# Patient Record
Sex: MALE | Race: WHITE | Employment: FULL TIME | URBAN - NONMETROPOLITAN AREA
[De-identification: names, ages, dates, MRNs, and addresses within clinical notes are randomized per-mention and may not be internally consistent; named-entity substitution may affect disease eponyms.]

---

## 2017-05-10 ENCOUNTER — PROCEDURE VISIT (OUTPATIENT)
Dept: CARDIOLOGY | Age: 57
End: 2017-05-10
Payer: COMMERCIAL

## 2017-05-10 DIAGNOSIS — I45.10 RBBB: ICD-10-CM

## 2017-05-10 DIAGNOSIS — I49.5 SICK SINUS SYNDROME (HCC): ICD-10-CM

## 2017-05-10 DIAGNOSIS — Z95.0 CARDIAC PACEMAKER IN SITU: Primary | ICD-10-CM

## 2017-05-10 PROCEDURE — 93280 PM DEVICE PROGR EVAL DUAL: CPT | Performed by: INTERNAL MEDICINE

## 2017-08-04 ENCOUNTER — OFFICE VISIT (OUTPATIENT)
Dept: FAMILY MEDICINE CLINIC | Age: 57
End: 2017-08-04
Payer: COMMERCIAL

## 2017-08-04 ENCOUNTER — NURSE ONLY (OUTPATIENT)
Dept: LAB | Age: 57
End: 2017-08-04
Payer: COMMERCIAL

## 2017-08-04 VITALS
SYSTOLIC BLOOD PRESSURE: 136 MMHG | DIASTOLIC BLOOD PRESSURE: 80 MMHG | OXYGEN SATURATION: 95 % | BODY MASS INDEX: 31.08 KG/M2 | WEIGHT: 222 LBS | HEART RATE: 71 BPM | HEIGHT: 71 IN

## 2017-08-04 DIAGNOSIS — Z23 NEED FOR TDAP VACCINATION: ICD-10-CM

## 2017-08-04 DIAGNOSIS — Z95.0 PACEMAKER: ICD-10-CM

## 2017-08-04 DIAGNOSIS — I49.5 SICK SINUS SYNDROME (HCC): ICD-10-CM

## 2017-08-04 DIAGNOSIS — K21.9 GASTROESOPHAGEAL REFLUX DISEASE, ESOPHAGITIS PRESENCE NOT SPECIFIED: Primary | ICD-10-CM

## 2017-08-04 DIAGNOSIS — M54.50 INTERMITTENT LOW BACK PAIN: ICD-10-CM

## 2017-08-04 DIAGNOSIS — Z13.31 DEPRESSION SCREENING: ICD-10-CM

## 2017-08-04 DIAGNOSIS — Z11.59 NEED FOR HEPATITIS C SCREENING TEST: ICD-10-CM

## 2017-08-04 DIAGNOSIS — Z13.1 SCREENING FOR DIABETES MELLITUS: ICD-10-CM

## 2017-08-04 PROCEDURE — 99214 OFFICE O/P EST MOD 30 MIN: CPT | Performed by: FAMILY MEDICINE

## 2017-08-04 PROCEDURE — 90471 IMMUNIZATION ADMIN: CPT | Performed by: FAMILY MEDICINE

## 2017-08-04 PROCEDURE — G0444 DEPRESSION SCREEN ANNUAL: HCPCS | Performed by: FAMILY MEDICINE

## 2017-08-04 PROCEDURE — 90715 TDAP VACCINE 7 YRS/> IM: CPT | Performed by: FAMILY MEDICINE

## 2017-08-04 ASSESSMENT — ENCOUNTER SYMPTOMS
CONSTIPATION: 0
NAUSEA: 0
WHEEZING: 0
DIARRHEA: 0
BLOOD IN STOOL: 1
SHORTNESS OF BREATH: 0
VOMITING: 0

## 2017-08-04 ASSESSMENT — PATIENT HEALTH QUESTIONNAIRE - PHQ9
SUM OF ALL RESPONSES TO PHQ QUESTIONS 1-9: 0
SUM OF ALL RESPONSES TO PHQ9 QUESTIONS 1 & 2: 0
2. FEELING DOWN, DEPRESSED OR HOPELESS: 0
1. LITTLE INTEREST OR PLEASURE IN DOING THINGS: 0

## 2017-09-05 ENCOUNTER — HOSPITAL ENCOUNTER (OUTPATIENT)
Dept: LAB | Age: 57
Discharge: HOME | End: 2017-09-05
Payer: COMMERCIAL

## 2017-09-05 DIAGNOSIS — Z13.1 SCREENING FOR DIABETES MELLITUS: ICD-10-CM

## 2017-09-05 DIAGNOSIS — Z11.59 NEED FOR HEPATITIS C SCREENING TEST: ICD-10-CM

## 2017-09-05 LAB
ALBUMIN SERPL-MCNC: 4.2 G/DL (ref 3.5–5.2)
ALBUMIN/GLOBULIN RATIO: 1.4 (ref 1–2.5)
ALP BLD-CCNC: 89 U/L (ref 40–129)
ALT SERPL-CCNC: 14 U/L (ref 5–41)
ANION GAP SERPL CALCULATED.3IONS-SCNC: 11 MMOL/L (ref 9–17)
AST SERPL-CCNC: 16 U/L
BILIRUB SERPL-MCNC: 0.85 MG/DL (ref 0.3–1.2)
BUN BLDV-MCNC: 13 MG/DL (ref 6–20)
BUN/CREAT BLD: 12 (ref 9–20)
CALCIUM SERPL-MCNC: 9.1 MG/DL (ref 8.6–10.4)
CHLORIDE BLD-SCNC: 104 MMOL/L (ref 98–107)
CO2: 26 MMOL/L (ref 20–31)
CREAT SERPL-MCNC: 1.05 MG/DL (ref 0.7–1.2)
GFR AFRICAN AMERICAN: >60 ML/MIN
GFR NON-AFRICAN AMERICAN: >60 ML/MIN
GFR SERPL CREATININE-BSD FRML MDRD: ABNORMAL ML/MIN/{1.73_M2}
GFR SERPL CREATININE-BSD FRML MDRD: ABNORMAL ML/MIN/{1.73_M2}
GLUCOSE BLD-MCNC: 100 MG/DL (ref 70–99)
HEPATITIS C ANTIBODY: NONREACTIVE
POTASSIUM SERPL-SCNC: 4.7 MMOL/L (ref 3.7–5.3)
SODIUM BLD-SCNC: 141 MMOL/L (ref 135–144)
TOTAL PROTEIN: 7.1 G/DL (ref 6.4–8.3)

## 2017-09-05 PROCEDURE — 86803 HEPATITIS C AB TEST: CPT

## 2017-09-05 PROCEDURE — 36415 COLL VENOUS BLD VENIPUNCTURE: CPT

## 2017-09-05 PROCEDURE — 80053 COMPREHEN METABOLIC PANEL: CPT

## 2017-09-15 ENCOUNTER — OFFICE VISIT (OUTPATIENT)
Dept: CARDIOLOGY | Age: 57
End: 2017-09-15
Payer: COMMERCIAL

## 2017-09-15 VITALS
HEIGHT: 71 IN | SYSTOLIC BLOOD PRESSURE: 156 MMHG | DIASTOLIC BLOOD PRESSURE: 100 MMHG | WEIGHT: 222 LBS | HEART RATE: 66 BPM | BODY MASS INDEX: 31.08 KG/M2

## 2017-09-15 DIAGNOSIS — I49.5 SICK SINUS SYNDROME (HCC): Primary | ICD-10-CM

## 2017-09-15 DIAGNOSIS — Z95.0 PACEMAKER: ICD-10-CM

## 2017-09-15 PROCEDURE — 99213 OFFICE O/P EST LOW 20 MIN: CPT | Performed by: INTERNAL MEDICINE

## 2017-09-15 PROCEDURE — 93000 ELECTROCARDIOGRAM COMPLETE: CPT | Performed by: INTERNAL MEDICINE

## 2017-09-15 ASSESSMENT — ENCOUNTER SYMPTOMS
SHORTNESS OF BREATH: 0
CHEST TIGHTNESS: 0
ABDOMINAL PAIN: 0
EYE ITCHING: 0
VOMITING: 0
NAUSEA: 0

## 2018-04-23 ENCOUNTER — PROCEDURE VISIT (OUTPATIENT)
Dept: CARDIOLOGY | Age: 58
End: 2018-04-23
Payer: COMMERCIAL

## 2018-04-23 DIAGNOSIS — Z95.0 PACEMAKER: Primary | ICD-10-CM

## 2018-04-23 DIAGNOSIS — I49.5 SICK SINUS SYNDROME (HCC): ICD-10-CM

## 2018-04-23 PROCEDURE — 93280 PM DEVICE PROGR EVAL DUAL: CPT | Performed by: INTERNAL MEDICINE

## 2018-05-11 ENCOUNTER — OFFICE VISIT (OUTPATIENT)
Dept: CARDIOLOGY | Age: 58
End: 2018-05-11
Payer: COMMERCIAL

## 2018-05-11 VITALS
HEART RATE: 60 BPM | SYSTOLIC BLOOD PRESSURE: 134 MMHG | WEIGHT: 224 LBS | HEIGHT: 71 IN | BODY MASS INDEX: 31.36 KG/M2 | DIASTOLIC BLOOD PRESSURE: 86 MMHG

## 2018-05-11 DIAGNOSIS — Z95.0 PACEMAKER: Primary | ICD-10-CM

## 2018-05-11 DIAGNOSIS — R07.9 CHEST PAIN, UNSPECIFIED TYPE: ICD-10-CM

## 2018-05-11 PROCEDURE — 99214 OFFICE O/P EST MOD 30 MIN: CPT | Performed by: INTERNAL MEDICINE

## 2018-05-11 PROCEDURE — 93000 ELECTROCARDIOGRAM COMPLETE: CPT | Performed by: INTERNAL MEDICINE

## 2018-05-11 ASSESSMENT — ENCOUNTER SYMPTOMS
CHEST TIGHTNESS: 0
DIARRHEA: 0
NAUSEA: 0
VOMITING: 0
BACK PAIN: 0
ABDOMINAL DISTENTION: 0
SHORTNESS OF BREATH: 0

## 2018-05-24 ENCOUNTER — HOSPITAL ENCOUNTER (OUTPATIENT)
Dept: NON INVASIVE DIAGNOSTICS | Age: 58
Discharge: HOME | End: 2018-05-24
Payer: COMMERCIAL

## 2018-05-24 ENCOUNTER — HOSPITAL ENCOUNTER (OUTPATIENT)
Dept: NUCLEAR MEDICINE | Age: 58
Discharge: HOME | End: 2018-05-26
Payer: COMMERCIAL

## 2018-05-24 ENCOUNTER — HOSPITAL ENCOUNTER (OUTPATIENT)
Dept: NUCLEAR MEDICINE | Age: 58
End: 2018-05-24
Payer: COMMERCIAL

## 2018-05-24 DIAGNOSIS — R07.9 CHEST PAIN, UNSPECIFIED TYPE: ICD-10-CM

## 2018-05-24 PROCEDURE — A9500 TC99M SESTAMIBI: HCPCS | Performed by: INTERNAL MEDICINE

## 2018-05-24 PROCEDURE — 3430000000 HC RX DIAGNOSTIC RADIOPHARMACEUTICAL: Performed by: INTERNAL MEDICINE

## 2018-05-24 PROCEDURE — 78452 HT MUSCLE IMAGE SPECT MULT: CPT

## 2018-05-24 PROCEDURE — 93017 CV STRESS TEST TRACING ONLY: CPT

## 2018-05-24 RX ADMIN — TETRAKIS(2-METHOXYISOBUTYLISOCYANIDE)COPPER(I) TETRAFLUOROBORATE 10 MILLICURIE: 1 INJECTION, POWDER, LYOPHILIZED, FOR SOLUTION INTRAVENOUS at 13:13

## 2018-05-24 RX ADMIN — TETRAKIS(2-METHOXYISOBUTYLISOCYANIDE)COPPER(I) TETRAFLUOROBORATE 30 MILLICURIE: 1 INJECTION, POWDER, LYOPHILIZED, FOR SOLUTION INTRAVENOUS at 15:05

## 2018-06-01 LAB
AVERAGE GLUCOSE: NORMAL
AVERAGE GLUCOSE: NORMAL
BASOPHILS ABSOLUTE: 52 /ΜL
BASOPHILS RELATIVE PERCENT: NORMAL
CHOLESTEROL, TOTAL: 162 MG/DL
CHOLESTEROL, TOTAL: 162 MG/DL
CHOLESTEROL/HDL RATIO: 3.8
CHOLESTEROL/HDL RATIO: 3.8
EOSINOPHILS ABSOLUTE: 266 /ΜL
EOSINOPHILS RELATIVE PERCENT: NORMAL
HBA1C MFR BLD: 5.6 %
HBA1C MFR BLD: 5.6 %
HCT VFR BLD CALC: 42.8 % (ref 41–53)
HDLC SERPL-MCNC: 43 MG/DL (ref 35–70)
HDLC SERPL-MCNC: 43 MG/DL (ref 35–70)
HEMOGLOBIN: NORMAL
LDL CHOLESTEROL CALCULATED: 95 MG/DL (ref 0–160)
LDL CHOLESTEROL CALCULATED: 95 MG/DL (ref 0–160)
LYMPHOCYTES ABSOLUTE: 1924 /ΜL
LYMPHOCYTES RELATIVE PERCENT: NORMAL
MCH RBC QN AUTO: 26.9 PG
MCHC RBC AUTO-ENTMCNC: 31.3 G/DL
MCV RBC AUTO: 85.9 FL
MONOCYTES ABSOLUTE: 577 /ΜL
MONOCYTES RELATIVE PERCENT: NORMAL
NEUTROPHILS ABSOLUTE: 4581 /ΜL
NEUTROPHILS RELATIVE PERCENT: NORMAL
NONHDLC SERPL-MCNC: NORMAL MG/DL
PLATELET # BLD: 13.4 K/ΜL
PMV BLD AUTO: 10.8 FL
PROSTATE SPECIFIC ANTIGEN FREE: NORMAL
PROSTATE SPECIFIC ANTIGEN PERCENT FREE: NORMAL
PSA-PROSTATE SPECIFIC AG: 0.9
RBC # BLD: 4.98 10^6/ΜL
TRIGL SERPL-MCNC: 139 MG/DL
TRIGL SERPL-MCNC: 139 MG/DL
VLDLC SERPL CALC-MCNC: NORMAL MG/DL
VLDLC SERPL CALC-MCNC: NORMAL MG/DL
WBC # BLD: 7.4 10^3/ML

## 2018-06-04 ENCOUNTER — TELEPHONE (OUTPATIENT)
Dept: CARDIOLOGY | Age: 58
End: 2018-06-04

## 2018-10-05 ENCOUNTER — OFFICE VISIT (OUTPATIENT)
Dept: FAMILY MEDICINE CLINIC | Age: 58
End: 2018-10-05
Payer: COMMERCIAL

## 2018-10-05 VITALS
DIASTOLIC BLOOD PRESSURE: 96 MMHG | WEIGHT: 226 LBS | SYSTOLIC BLOOD PRESSURE: 160 MMHG | OXYGEN SATURATION: 99 % | HEIGHT: 71 IN | BODY MASS INDEX: 31.64 KG/M2 | HEART RATE: 66 BPM

## 2018-10-05 DIAGNOSIS — I49.5 SICK SINUS SYNDROME (HCC): ICD-10-CM

## 2018-10-05 DIAGNOSIS — Z00.00 ROUTINE MEDICAL EXAM: Primary | ICD-10-CM

## 2018-10-05 DIAGNOSIS — K21.9 GASTROESOPHAGEAL REFLUX DISEASE, ESOPHAGITIS PRESENCE NOT SPECIFIED: ICD-10-CM

## 2018-10-05 PROCEDURE — 99396 PREV VISIT EST AGE 40-64: CPT | Performed by: FAMILY MEDICINE

## 2018-10-05 ASSESSMENT — PATIENT HEALTH QUESTIONNAIRE - PHQ9
SUM OF ALL RESPONSES TO PHQ QUESTIONS 1-9: 1
2. FEELING DOWN, DEPRESSED OR HOPELESS: 1
SUM OF ALL RESPONSES TO PHQ QUESTIONS 1-9: 1
SUM OF ALL RESPONSES TO PHQ QUESTIONS 1-9: 1
1. LITTLE INTEREST OR PLEASURE IN DOING THINGS: 1
SUM OF ALL RESPONSES TO PHQ QUESTIONS 1-9: 1

## 2018-10-05 ASSESSMENT — ENCOUNTER SYMPTOMS
DIARRHEA: 0
VOMITING: 0
BLOOD IN STOOL: 1
SHORTNESS OF BREATH: 0
WHEEZING: 0

## 2018-10-05 NOTE — PROGRESS NOTES
Soft. Bowel sounds are normal. He exhibits no distension. There is no tenderness. Musculoskeletal: He exhibits no edema. Neurological: He is alert and oriented to person, place, and time. Skin: Skin is warm and dry. Psychiatric: He has a normal mood and affect. Assessment:       Diagnosis Orders   1. Routine medical exam     2. Gastroesophageal reflux disease, esophagitis presence not specified     3. Sick sinus syndrome ([facility] [address]           Plan:      Declined flu vaccine and Shingrix vaccines today. Return in about 1 year (around 10/5/2019) for Follow-up. Patient given educational materials - see patient instructions. Discussed use, benefit, and side effects of prescribed medications. All patient questions answered. Pt voiced understanding. Reviewed health maintenance.               Electronically signed by Mesfin Pina DO on 10/6/2018 at 7:28 AM

## 2018-10-05 NOTE — PATIENT INSTRUCTIONS
close tightly enough. If you have mild GERD symptoms including heartburn, you may be able to control the problem with antacids or over-the-counter medicine. Changing your diet, losing weight, and making other lifestyle changes can also help reduce symptoms. Follow-up care is a key part of your treatment and safety. Be sure to make and go to all appointments, and call your doctor if you are having problems. It's also a good idea to know your test results and keep a list of the medicines you take. How can you care for yourself at home? · Take your medicines exactly as prescribed. Call your doctor if you think you are having a problem with your medicine. · Your doctor may recommend over-the-counter medicine. For mild or occasional indigestion, antacids, such as Tums, Gaviscon, Mylanta, or Maalox, may help. Your doctor also may recommend over-the-counter acid reducers, such as Pepcid AC, Tagamet HB, Zantac 75, or Prilosec. Read and follow all instructions on the label. If you use these medicines often, talk with your doctor. · Change your eating habits. ¨ It's best to eat several small meals instead of two or three large meals. ¨ After you eat, wait 2 to 3 hours before you lie down. ¨ Chocolate, mint, and alcohol can make GERD worse. ¨ Spicy foods, foods that have a lot of acid (like tomatoes and oranges), and coffee can make GERD symptoms worse in some people. If your symptoms are worse after you eat a certain food, you may want to stop eating that food to see if your symptoms get better. · Do not smoke or chew tobacco. Smoking can make GERD worse. If you need help quitting, talk to your doctor about stop-smoking programs and medicines. These can increase your chances of quitting for good. · If you have GERD symptoms at night, raise the head of your bed 6 to 8 inches by putting the frame on blocks or placing a foam wedge under the head of your mattress.  (Adding extra pillows does not work.)  · Do not wear

## 2019-04-22 ENCOUNTER — PROCEDURE VISIT (OUTPATIENT)
Dept: CARDIOLOGY | Age: 59
End: 2019-04-22
Payer: COMMERCIAL

## 2019-04-22 DIAGNOSIS — I49.5 SICK SINUS SYNDROME (HCC): ICD-10-CM

## 2019-04-22 DIAGNOSIS — Z95.0 PACEMAKER: ICD-10-CM

## 2019-04-22 PROCEDURE — 93288 INTERROG EVL PM/LDLS PM IP: CPT | Performed by: INTERNAL MEDICINE

## 2019-05-17 ENCOUNTER — OFFICE VISIT (OUTPATIENT)
Dept: CARDIOLOGY | Age: 59
End: 2019-05-17
Payer: COMMERCIAL

## 2019-05-17 VITALS
HEART RATE: 60 BPM | WEIGHT: 223 LBS | DIASTOLIC BLOOD PRESSURE: 82 MMHG | SYSTOLIC BLOOD PRESSURE: 143 MMHG | HEIGHT: 72 IN | BODY MASS INDEX: 30.2 KG/M2

## 2019-05-17 DIAGNOSIS — R07.9 CHEST PAIN, UNSPECIFIED TYPE: Primary | ICD-10-CM

## 2019-05-17 DIAGNOSIS — I49.5 SICK SINUS SYNDROME (HCC): ICD-10-CM

## 2019-05-17 DIAGNOSIS — Z95.0 PACEMAKER: ICD-10-CM

## 2019-05-17 DIAGNOSIS — I10 ESSENTIAL HYPERTENSION: ICD-10-CM

## 2019-05-17 PROCEDURE — 99213 OFFICE O/P EST LOW 20 MIN: CPT | Performed by: INTERNAL MEDICINE

## 2019-05-17 PROCEDURE — 93000 ELECTROCARDIOGRAM COMPLETE: CPT | Performed by: INTERNAL MEDICINE

## 2019-05-17 RX ORDER — ACETAMINOPHEN 500 MG
500 TABLET ORAL EVERY 6 HOURS PRN
COMMUNITY
End: 2019-08-19

## 2019-05-17 NOTE — PROGRESS NOTES
[address] [address]  Dept: [phone]  Loc: [phone]    Subjective: The patient is a 60y.o. year old, , male is in the office for a follow up visit. Patient is doing quit well from cardiac stand point. He jessica any chest pain or discomfort. No orthopnea or PND. Jessica any palpitation, dizziness or syncope. He is completely asymptomatic from cardiac stand point. Past Medical History:   has a past medical history of Allergic rhinitis, Fainting, Granuloma, skin, Measles, Mumps, and Sick sinus syndrome ([facility] [address] Past Surgical History:   has a past surgical history that includes Colonoscopy (02/08/2011); pacemaker placement (8/4/2006); pacemaker placement (9/15/2014); and toenail excision (Left, 09/12/78 ). Home Medications:  Prior to Admission medications    Medication Sig Start Date End Date Taking? Authorizing Provider   acetaminophen (TYLENOL) 500 MG tablet Take 500 mg by mouth every 6 hours as needed for Pain   Yes Historical Provider, MD       Allergies:  Patient has no known allergies. Social History:   reports that he has never smoked. He has never used smokeless tobacco. He reports that he drinks alcohol. He reports that he does not use drugs. Review of Systems:  · Constitutional: there has been no unanticipated weight loss. There's been No change in energy level, No change in activity level. · Eyes: No visual changes or diplopia. No scleral icterus. · ENT: No Headaches, hearing loss or vertigo. No mouth sores or sore throat. · Cardiovascular: As above. · Respiratory: No SOB, cough or hemoptysis. · Gastrointestinal: No abdominal pain, appetite loss, blood in stools. No change in bowel or bladder habits. · Genitourinary: No dysuria, trouble voiding, or hematuria. · Musculoskeletal:  No gait disturbance, No weakness or joint complaints. · Integumentary: No rash or pruritis.   · Psychiatric: No anxiety, or HOME , WILL CONTINUE CURRENT MEDICATIONS    DISCUSSED IN DETAILS ABOUT RISK MODIFICATION    RETURN VISIT IN 6 MONTHS, IF ANY SYMPTOM CHANGE PATIENT ADVISED TO GO TO THE EMERGENCY ROOM.           Dea Mcbride [address] Cardiology Consult           [phone]

## 2019-08-19 ENCOUNTER — OFFICE VISIT (OUTPATIENT)
Dept: FAMILY MEDICINE CLINIC | Age: 59
End: 2019-08-19
Payer: COMMERCIAL

## 2019-08-19 ENCOUNTER — HOSPITAL ENCOUNTER (OUTPATIENT)
Dept: LAB | Age: 59
Discharge: HOME | End: 2019-08-19
Payer: COMMERCIAL

## 2019-08-19 VITALS
SYSTOLIC BLOOD PRESSURE: 142 MMHG | BODY MASS INDEX: 29.53 KG/M2 | OXYGEN SATURATION: 96 % | WEIGHT: 218 LBS | HEIGHT: 72 IN | DIASTOLIC BLOOD PRESSURE: 86 MMHG | HEART RATE: 64 BPM

## 2019-08-19 DIAGNOSIS — Z12.5 SCREENING FOR PROSTATE CANCER: ICD-10-CM

## 2019-08-19 DIAGNOSIS — I49.5 SICK SINUS SYNDROME (HCC): ICD-10-CM

## 2019-08-19 DIAGNOSIS — Z00.00 ENCOUNTER FOR ROUTINE ADULT MEDICAL EXAMINATION: Primary | ICD-10-CM

## 2019-08-19 LAB — PROSTATE SPECIFIC ANTIGEN: 1.37 UG/L

## 2019-08-19 PROCEDURE — 99396 PREV VISIT EST AGE 40-64: CPT | Performed by: FAMILY MEDICINE

## 2019-08-19 PROCEDURE — 36415 COLL VENOUS BLD VENIPUNCTURE: CPT

## 2019-08-19 PROCEDURE — G0103 PSA SCREENING: HCPCS

## 2019-08-19 ASSESSMENT — PATIENT HEALTH QUESTIONNAIRE - PHQ9
2. FEELING DOWN, DEPRESSED OR HOPELESS: 0
SUM OF ALL RESPONSES TO PHQ9 QUESTIONS 1 & 2: 0
SUM OF ALL RESPONSES TO PHQ QUESTIONS 1-9: 0
SUM OF ALL RESPONSES TO PHQ QUESTIONS 1-9: 0
1. LITTLE INTEREST OR PLEASURE IN DOING THINGS: 0

## 2019-08-19 ASSESSMENT — ENCOUNTER SYMPTOMS
CONSTIPATION: 0
BLOOD IN STOOL: 1
SHORTNESS OF BREATH: 0
DIARRHEA: 0

## 2019-08-19 NOTE — PROGRESS NOTES
Never Smoker    Smokeless tobacco: Never Used   Substance Use Topics    Alcohol use: Yes     Comment: social      No current outpatient medications on file. No current facility-administered medications for this visit. No Known Allergies    Health Maintenance   Topic Date Due    Shingles Vaccine (1 of 2) 08/19/2020 (Originally 12/18/2009)    Flu vaccine (1) 09/01/2019    Colon cancer screen colonoscopy  02/08/2021    Lipid screen  06/01/2023    DTaP/Tdap/Td vaccine (2 - Td) 08/04/2027    Hepatitis C screen  Addressed    HIV screen  Addressed    Pneumococcal 0-64 years Vaccine  Aged Out       Subjective:      Review of Systems   Respiratory: Negative for shortness of breath. Cardiovascular: Negative for chest pain and palpitations. Gastrointestinal: Positive for blood in stool (occasional hemorrhoids). Negative for constipation and diarrhea. Genitourinary: Negative for dysuria and hematuria. Skin: Negative for rash and wound. Neurological: Negative for dizziness and light-headedness. Psychiatric/Behavioral: Negative for dysphoric mood and suicidal ideas. Objective:     Vitals:    08/19/19 1444 08/19/19 1459   BP: (!) 154/96 (!) 142/86   Site: Right Upper Arm Right Upper Arm   Position: Sitting Sitting   Cuff Size: Large Adult Large Adult   Pulse: 64    SpO2: 96%    Weight: 218 lb (98.9 kg)    Height: 5' 11.5\" (1.816 m)      Physical Exam   Constitutional: He is oriented to person, place, and time. He appears well-developed and well-nourished. No distress. HENT:   Head: Normocephalic and atraumatic. Right Ear: External ear normal.   Left Ear: External ear normal.   Eyes: Conjunctivae are normal.   Cardiovascular: Normal rate, regular rhythm and normal heart sounds. Pulmonary/Chest: Effort normal and breath sounds normal. No respiratory distress. Abdominal: Soft. Bowel sounds are normal. He exhibits no distension. There is no tenderness.    Musculoskeletal: He exhibits no edema. Neurological: He is alert and oriented to person, place, and time. Skin: Skin is warm and dry. Psychiatric: He has a normal mood and affect. Assessment:       Diagnosis Orders   1. Encounter for routine adult medical examination     2. Sick sinus syndrome ([facility] [address]     3. Screening for prostate cancer  Psa screening         Plan:      Declined Shingrix vaccine today. Return in about 1 year (around 8/19/2020) for annual physical.    Orders Placed This Encounter   Procedures    Psa screening     Standing Status:   Future     Number of Occurrences:   1     Standing Expiration Date:   8/18/2020     No orders of the defined types were placed in this encounter. Patient given educational materials - see patient instructions. Discussed use, benefit, and side effects of prescribed medications. All patient questions answered. Pt voiced understanding. Reviewed health maintenance.             Electronically signed by Ellis Kirkland DO on 8/25/2019 at 11:31 PM

## 2019-11-22 ENCOUNTER — HOSPITAL ENCOUNTER (OUTPATIENT)
Dept: GENERAL RADIOLOGY | Age: 59
Discharge: HOME | End: 2019-11-24
Payer: COMMERCIAL

## 2019-11-22 ENCOUNTER — OFFICE VISIT (OUTPATIENT)
Dept: PRIMARY CARE CLINIC | Age: 59
End: 2019-11-22
Payer: COMMERCIAL

## 2019-11-22 VITALS
WEIGHT: 231 LBS | OXYGEN SATURATION: 97 % | HEIGHT: 70 IN | SYSTOLIC BLOOD PRESSURE: 154 MMHG | HEART RATE: 74 BPM | RESPIRATION RATE: 14 BRPM | BODY MASS INDEX: 33.07 KG/M2 | DIASTOLIC BLOOD PRESSURE: 84 MMHG

## 2019-11-22 DIAGNOSIS — M67.441 GANGLION CYST OF FINGER OF RIGHT HAND: ICD-10-CM

## 2019-11-22 DIAGNOSIS — M67.441 GANGLION CYST OF FINGER OF RIGHT HAND: Primary | ICD-10-CM

## 2019-11-22 PROCEDURE — 99213 OFFICE O/P EST LOW 20 MIN: CPT | Performed by: PHYSICIAN ASSISTANT

## 2019-11-22 PROCEDURE — 73130 X-RAY EXAM OF HAND: CPT

## 2019-11-22 ASSESSMENT — PATIENT HEALTH QUESTIONNAIRE - PHQ9
2. FEELING DOWN, DEPRESSED OR HOPELESS: 0
SUM OF ALL RESPONSES TO PHQ QUESTIONS 1-9: 0
SUM OF ALL RESPONSES TO PHQ QUESTIONS 1-9: 0
1. LITTLE INTEREST OR PLEASURE IN DOING THINGS: 0
SUM OF ALL RESPONSES TO PHQ9 QUESTIONS 1 & 2: 0

## 2019-12-13 ENCOUNTER — OFFICE VISIT (OUTPATIENT)
Dept: ORTHOPEDIC SURGERY | Age: 59
End: 2019-12-13
Payer: COMMERCIAL

## 2019-12-13 VITALS
DIASTOLIC BLOOD PRESSURE: 90 MMHG | WEIGHT: 231 LBS | SYSTOLIC BLOOD PRESSURE: 184 MMHG | HEIGHT: 70 IN | BODY MASS INDEX: 33.07 KG/M2 | HEART RATE: 84 BPM

## 2019-12-13 DIAGNOSIS — R22.31 MASS OF RIGHT HAND: Primary | ICD-10-CM

## 2019-12-13 PROCEDURE — 99203 OFFICE O/P NEW LOW 30 MIN: CPT | Performed by: ORTHOPAEDIC SURGERY

## 2020-06-22 ENCOUNTER — PROCEDURE VISIT (OUTPATIENT)
Dept: CARDIOLOGY | Age: 60
End: 2020-06-22
Payer: COMMERCIAL

## 2020-06-22 PROCEDURE — 93288 INTERROG EVL PM/LDLS PM IP: CPT | Performed by: INTERNAL MEDICINE

## 2020-06-26 ENCOUNTER — OFFICE VISIT (OUTPATIENT)
Dept: CARDIOLOGY | Age: 60
End: 2020-06-26
Payer: COMMERCIAL

## 2020-06-26 VITALS
BODY MASS INDEX: 32.87 KG/M2 | HEART RATE: 70 BPM | WEIGHT: 229.6 LBS | RESPIRATION RATE: 16 BRPM | DIASTOLIC BLOOD PRESSURE: 88 MMHG | HEIGHT: 70 IN | SYSTOLIC BLOOD PRESSURE: 160 MMHG | TEMPERATURE: 97.8 F

## 2020-06-26 PROCEDURE — 93000 ELECTROCARDIOGRAM COMPLETE: CPT | Performed by: INTERNAL MEDICINE

## 2020-06-26 PROCEDURE — 99214 OFFICE O/P EST MOD 30 MIN: CPT | Performed by: INTERNAL MEDICINE

## 2020-06-26 RX ORDER — AMLODIPINE BESYLATE 5 MG/1
5 TABLET ORAL DAILY
Qty: 90 TABLET | Refills: 1 | Status: SHIPPED | OUTPATIENT
Start: 2020-06-26 | End: 2020-08-14 | Stop reason: SDUPTHER

## 2020-08-14 RX ORDER — AMLODIPINE BESYLATE 5 MG/1
5 TABLET ORAL DAILY
Qty: 90 TABLET | Refills: 3 | Status: SHIPPED | OUTPATIENT
Start: 2020-08-14 | End: 2021-06-28 | Stop reason: SDUPTHER

## 2020-08-14 NOTE — TELEPHONE ENCOUNTER
Last Appt:  6/26/2020  Next Appt:   06/28/2021  Med verified in Epic    Pt came in with a letter from his pharmacy saying they couldn't fill his Rx for mail order because we never responded to the request. Letter scanned and attached to this encounter.       Pt needs Amlodipine tab 5mg sent to [facility] mail service pharmacy

## 2020-10-12 ENCOUNTER — OFFICE VISIT (OUTPATIENT)
Dept: PRIMARY CARE CLINIC | Age: 60
End: 2020-10-12
Payer: COMMERCIAL

## 2020-10-12 VITALS
TEMPERATURE: 97.7 F | SYSTOLIC BLOOD PRESSURE: 138 MMHG | HEIGHT: 71 IN | WEIGHT: 231 LBS | BODY MASS INDEX: 32.34 KG/M2 | OXYGEN SATURATION: 99 % | DIASTOLIC BLOOD PRESSURE: 82 MMHG | HEART RATE: 72 BPM | RESPIRATION RATE: 16 BRPM

## 2020-10-12 PROCEDURE — 99212 OFFICE O/P EST SF 10 MIN: CPT | Performed by: FAMILY MEDICINE

## 2020-10-12 PROCEDURE — 99213 OFFICE O/P EST LOW 20 MIN: CPT | Performed by: FAMILY MEDICINE

## 2020-10-12 ASSESSMENT — PATIENT HEALTH QUESTIONNAIRE - PHQ9
1. LITTLE INTEREST OR PLEASURE IN DOING THINGS: 0
SUM OF ALL RESPONSES TO PHQ9 QUESTIONS 1 & 2: 0
SUM OF ALL RESPONSES TO PHQ QUESTIONS 1-9: 0
SUM OF ALL RESPONSES TO PHQ QUESTIONS 1-9: 0
2. FEELING DOWN, DEPRESSED OR HOPELESS: 0

## 2020-10-12 ASSESSMENT — ENCOUNTER SYMPTOMS: BACK PAIN: 1
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
## 2020-10-26 LAB
CHOLESTEROL, TOTAL: NORMAL
CHOLESTEROL/HDL RATIO: NORMAL
HDLC SERPL-MCNC: 38 MG/DL (ref 35–70)
LDL CHOLESTEROL CALCULATED: NORMAL
NONHDLC SERPL-MCNC: NORMAL MG/DL
TRIGL SERPL-MCNC: 143 MG/DL
VLDLC SERPL CALC-MCNC: NORMAL MG/DL

## 2020-12-14 ENCOUNTER — HOSPITAL ENCOUNTER (OUTPATIENT)
Dept: LAB | Age: 60
Discharge: HOME | End: 2020-12-14
Payer: COMMERCIAL

## 2020-12-14 ENCOUNTER — OFFICE VISIT (OUTPATIENT)
Dept: FAMILY MEDICINE CLINIC | Age: 60
End: 2020-12-14
Payer: COMMERCIAL

## 2020-12-14 VITALS
HEART RATE: 68 BPM | DIASTOLIC BLOOD PRESSURE: 84 MMHG | OXYGEN SATURATION: 98 % | HEIGHT: 71 IN | TEMPERATURE: 98 F | BODY MASS INDEX: 31.92 KG/M2 | SYSTOLIC BLOOD PRESSURE: 144 MMHG | WEIGHT: 228 LBS

## 2020-12-14 LAB
ALBUMIN SERPL-MCNC: 4.4 G/DL (ref 3.5–5.2)
ALBUMIN/GLOBULIN RATIO: 1.5 (ref 1–2.5)
ALP BLD-CCNC: 112 U/L (ref 40–129)
ALT SERPL-CCNC: 19 U/L (ref 5–41)
ANION GAP SERPL CALCULATED.3IONS-SCNC: 9 MMOL/L (ref 9–17)
AST SERPL-CCNC: 19 U/L
BILIRUB SERPL-MCNC: 0.59 MG/DL (ref 0.3–1.2)
BUN BLDV-MCNC: 14 MG/DL (ref 8–23)
BUN/CREAT BLD: 14 (ref 9–20)
CALCIUM SERPL-MCNC: 9.7 MG/DL (ref 8.6–10.4)
CHLORIDE BLD-SCNC: 104 MMOL/L (ref 98–107)
CO2: 28 MMOL/L (ref 20–31)
CREAT SERPL-MCNC: 0.99 MG/DL (ref 0.7–1.2)
GFR AFRICAN AMERICAN: >60 ML/MIN
GFR NON-AFRICAN AMERICAN: >60 ML/MIN
GFR SERPL CREATININE-BSD FRML MDRD: NORMAL ML/MIN/{1.73_M2}
GFR SERPL CREATININE-BSD FRML MDRD: NORMAL ML/MIN/{1.73_M2}
GLUCOSE BLD-MCNC: 95 MG/DL (ref 70–99)
POTASSIUM SERPL-SCNC: 5 MMOL/L (ref 3.7–5.3)
PROSTATE SPECIFIC ANTIGEN: 1.25 UG/L
SODIUM BLD-SCNC: 141 MMOL/L (ref 135–144)
TOTAL PROTEIN: 7.4 G/DL (ref 6.4–8.3)

## 2020-12-14 PROCEDURE — 99396 PREV VISIT EST AGE 40-64: CPT | Performed by: FAMILY MEDICINE

## 2020-12-14 PROCEDURE — 36415 COLL VENOUS BLD VENIPUNCTURE: CPT

## 2020-12-14 PROCEDURE — 80053 COMPREHEN METABOLIC PANEL: CPT

## 2020-12-14 PROCEDURE — G0103 PSA SCREENING: HCPCS

## 2020-12-14 RX ORDER — OMEPRAZOLE 40 MG/1
40 CAPSULE, DELAYED RELEASE ORAL DAILY
Qty: 30 CAPSULE | Refills: 1 | Status: SHIPPED | OUTPATIENT
Start: 2020-12-14 | End: 2022-01-07 | Stop reason: DRUGHIGH

## 2020-12-14 ASSESSMENT — ENCOUNTER SYMPTOMS
SHORTNESS OF BREATH: 0
COUGH: 0
BLOOD IN STOOL: 0

## 2020-12-14 NOTE — PROGRESS NOTES
[address]  [address], [address] [place]  [phone]      Katie Flynn is a 60 y.o. male who presents today for his medical conditions/complaints as noted below. Katie Flynn is c/o of Annual Exam      HPI:     Pt here today for yearly physical.    Pt had Biometric screening on 10/26/20 at work - HDL 38, TG's 143, glucose 86, and /92. BP slightly elevated today - 144/84; came down from 162/82. BP was elevated at Cardio appt in 6/2020 at 160/88. Was started on Norvasc 5 mg daily at that appt. Tolerating well; did not see any negative side effects with starting it. Will see Cardio again in 6/2021. Going to chiropractor after increased neck and low back pain from work - was seen in [facility] on 10/12 and diagnosed with cervical strain and lumbar strain under [address] claim. Had been going more often, but starting last week, he will just go once monthly for maintenance. Not having much pain now; occasionally will take Naproxen as needed, but not often. Pacemaker was just checked on 6/22 - has this done yearly.     Pt has gained 10 lbs since OV last year, but states he has been watching his portion sizes and how often he eats. Sometimes if he hasn't eaten in awhile, he will have abdominal cramping right after with diarrhea. Otherwise, he has intermittent constipation/bloating. Happening maybe twice per week. Has had a couple episodes of reflux at night; sometimes feels like he can aspirate it and it makes him cough. Taking Prilosec OTC in the mornings; can tell if he doesn't take it.   Cannot eat spicy foods without taking it.         Past Medical History:   Diagnosis Date    Allergic rhinitis     Fainting     Granuloma, skin     elbow  1975    Measles     Mumps     Sick sinus syndrome [facility])       Past Surgical History:   Procedure Laterality Date    COLONOSCOPY  02/08/2011    PACEMAKER PLACEMENT  8/4/2006    PACEMAKER PLACEMENT  9/15/2014 battery replaced    TOENAIL EXCISION Left 09/12/78     great toenail     Family History   Problem Relation Age of Onset    High Blood Pressure Mother     Diabetes Mother     Osteoporosis Mother     Heart Disease Paternal Grandmother     Stroke Paternal Grandmother     Lung Cancer Paternal Grandfather      Social History     Tobacco Use    Smoking status: Never Smoker    Smokeless tobacco: Never Used   Substance Use Topics    Alcohol use: Yes     Comment: social      Current Outpatient Medications   Medication Sig Dispense Refill    zoster recombinant adjuvanted vaccine (SHINGRIX) 50 MCG/0.5ML SUSR injection Inject 0.5 mLs into the muscle See Admin Instructions 1 dose now and repeat in 2-6 months 0.5 mL 1    omeprazole (PRILOSEC) 40 MG delayed release capsule Take 1 capsule by mouth daily 30 capsule 1    amLODIPine (NORVASC) 5 MG tablet Take 1 tablet by mouth daily 90 tablet 3     No current facility-administered medications for this visit. No Known Allergies    Health Maintenance   Topic Date Due    Shingles Vaccine (1 of 2) 12/18/2009    Flu vaccine (1) 12/14/2021 (Originally 9/1/2020)    Colon cancer screen colonoscopy  02/08/2021    Diabetes screen  06/01/2021    Lipid screen  10/26/2025    DTaP/Tdap/Td vaccine (2 - Td) 08/04/2027    Hepatitis C screen  Completed    HIV screen  Addressed    Hepatitis A vaccine  Aged Out    Hepatitis B vaccine  Aged Out    Hib vaccine  Aged Out    Meningococcal (ACWY) vaccine  Aged Out    Pneumococcal 0-64 years Vaccine  Aged Out       Subjective:      Review of Systems   Constitutional: Negative for fever. Respiratory: Negative for cough and shortness of breath. Cardiovascular: Negative for chest pain, palpitations and leg swelling. Gastrointestinal: Negative for blood in stool. Genitourinary: Negative for dysuria and hematuria. Skin: Negative for rash and wound. Neurological: Negative for dizziness and light-headedness. Psychiatric/Behavioral: Negative for dysphoric mood and suicidal ideas. Objective:     Vitals:    12/14/20 0801 12/14/20 0827   BP: (!) 162/82 (!) 144/84   Site: Right Upper Arm Right Upper Arm   Position: Sitting Sitting   Cuff Size: Large Adult Large Adult   Pulse: 68    Temp: 98 °F (36.7 °C)    TempSrc: Tympanic    SpO2: 98%    Weight: 228 lb (103.4 kg)    Height: 5' 11\" (1.803 m)      Physical Exam  Vitals signs and nursing note reviewed. Constitutional:       General: He is not in acute distress. Appearance: He is well-developed. HENT:      Head: Normocephalic and atraumatic. Right Ear: Tympanic membrane, ear canal and external ear normal.      Left Ear: Tympanic membrane, ear canal and external ear normal.      Nose: Nose normal.      Mouth/Throat:      Mouth: Mucous membranes are moist.      Pharynx: Oropharynx is clear. No oropharyngeal exudate. Eyes:      Conjunctiva/sclera: Conjunctivae normal.   Cardiovascular:      Rate and Rhythm: Normal rate and regular rhythm. Heart sounds: Normal heart sounds. Pulmonary:      Effort: Pulmonary effort is normal. No respiratory distress. Breath sounds: Normal breath sounds. Abdominal:      General: Bowel sounds are normal. There is no distension. Palpations: Abdomen is soft. Tenderness: There is no abdominal tenderness. Skin:     General: Skin is warm and dry. Neurological:      Mental Status: He is alert and oriented to person, place, and time. Assessment:       Diagnosis Orders   1. Screening PSA (prostate specific antigen)  Psa screening   2. Need for shingles vaccine  zoster recombinant adjuvanted vaccine [facility]) 50 MCG/0.5ML SUSR injection   3. Essential hypertension  Comprehensive Metabolic Panel   4.  Gastroesophageal reflux disease, unspecified whether esophagitis present  omeprazole (PRILOSEC) 40 MG delayed release capsule 5. Screen for colon cancer  Idalmis Flores MD, General Surgery, [place]         Plan: Will increase Norvasc to 10 mg daily to get better control of BP - pt will monitor for increased swelling or other side effects, or sx's of low BP. Declined flu vaccine today. No follow-ups on file. Orders Placed This Encounter   Procedures    Psa screening     Standing Status:   Future     Standing Expiration Date:   12/14/2021    Comprehensive Metabolic Panel     Standing Status:   Future     Standing Expiration Date:   12/14/2021    Idalmis Flores MD, General Surgery, [place]     Referral Priority:   Routine     Referral Type:   Eval and Treat     Referral Reason:   Specialty Services Required     Referred to Provider:   Kelly Regan MD     Requested Specialty:   General Surgery     Number of Visits Requested:   1     Orders Placed This Encounter   Medications    zoster recombinant adjuvanted vaccine [facility]) 50 MCG/0.5ML SUSR injection     Sig: Inject 0.5 mLs into the muscle See Admin Instructions 1 dose now and repeat in 2-6 months     Dispense:  0.5 mL     Refill:  1    omeprazole (PRILOSEC) 40 MG delayed release capsule     Sig: Take 1 capsule by mouth daily     Dispense:  30 capsule     Refill:  1       Patient given educational materials - see patient instructions. Discussed use, benefit, and side effects of prescribed medications. All patient questions answered. Pt voiced understanding. Reviewed health maintenance.             Electronically signed by Riddhi Jackson DO on 12/14/2020 at 9:02 AM

## 2020-12-14 NOTE — PATIENT INSTRUCTIONS
Patient Education        Gastroesophageal Reflux Disease (GERD): Care Instructions  Overview     Gastroesophageal reflux disease (GERD) is the backward flow of stomach acid into the esophagus. The esophagus is the tube that leads from your throat to your stomach. A one-way valve prevents the stomach acid from backing up into this tube. But when you have GERD, this valve does not close tightly enough. This can also cause pain and swelling in your esophagus. (This is called esophagitis.)  If you have mild GERD symptoms including heartburn, you may be able to control the problem with antacids or over-the-counter medicine. You can also make lifestyle changes to help reduce your symptoms. These include changing your diet and eating habits, such as not eating late at night and losing weight. Follow-up care is a key part of your treatment and safety. Be sure to make and go to all appointments, and call your doctor if you are having problems. It's also a good idea to know your test results and keep a list of the medicines you take. How can you care for yourself at home? · Take your medicines exactly as prescribed. Call your doctor if you think you are having a problem with your medicine. · Your doctor may recommend over-the-counter medicine. For mild or occasional indigestion, antacids, such as Tums, Gaviscon, Mylanta, or Maalox, may help. Your doctor also may recommend over-the-counter acid reducers, such as famotidine (Pepcid AC), cimetidine (Tagamet HB), or omeprazole (Prilosec). Read and follow all instructions on the label. If you use these medicines often, talk with your doctor. · Change your eating habits. ? It's best to eat several small meals instead of two or three large meals. ? After you eat, wait 2 to 3 hours before you lie down. ? Chocolate, mint, and alcohol can make GERD worse. ? Spicy foods, foods that have a lot of acid (like tomatoes and oranges), and coffee can make GERD symptoms worse in some people. If your symptoms are worse after you eat a certain food, you may want to stop eating that food to see if your symptoms get better. · Do not smoke or chew tobacco. Smoking can make GERD worse. If you need help quitting, talk to your doctor about stop-smoking programs and medicines. These can increase your chances of quitting for good. · If you have GERD symptoms at night, raise the head of your bed 6 to 8 inches by putting the frame on blocks or placing a foam wedge under the head of your mattress. (Adding extra pillows does not work.)  · Do not wear tight clothing around your middle. · Lose weight if you need to. Losing just 5 to 10 pounds can help. When should you call for help? Call your doctor now or seek immediate medical care if:    · You have new or different belly pain.     · Your stools are black and tarlike or have streaks of blood. Watch closely for changes in your health, and be sure to contact your doctor if:    · Your symptoms have not improved after 2 days.     · Food seems to catch in your throat or chest.   Where can you learn more? Go to [email or web address]. org and sign in to your [employer] account. Enter Z444 in the [facility] box to learn more about \"Gastroesophageal Reflux Disease (GERD): Care Instructions. \"     If you do not have an account, please click on the \"Sign Up Now\" link. Current as of: April 15, 2020               Content Version: 12.6  © 3233-8359 [employer]. Care instructions adapted under license by [facility] ([facility]). If you have questions about a medical condition or this instruction, always ask your healthcare professional. [address] any warranty or liability for your use of this information.

## 2020-12-15 ENCOUNTER — TELEPHONE (OUTPATIENT)
Dept: SURGERY | Age: 60
End: 2020-12-15

## 2020-12-15 NOTE — TELEPHONE ENCOUNTER
Mailed Dr Forte Punch colonoscopy paperwork for repeat colonoscopy. Last colonoscopy with Dr Kaye Briggs  2/8/2011,under [facility].   Patient in not having any sxs or problems

## 2021-01-04 ENCOUNTER — TELEPHONE (OUTPATIENT)
Dept: FAMILY MEDICINE CLINIC | Age: 61
End: 2021-01-04

## 2021-01-04 NOTE — TELEPHONE ENCOUNTER
Pt calling stating at last visit he was told to take 10mg of Norvasc in place of 5mg, pt states he took 10mg until the 31 and then he couldn't hardly walk and noticed his feet and legs were swollen up to his knees, so pt went back to taking 5mg. Pt also states the Prilosec has helped greatly with his reflux. Please call above number with recommendations.

## 2021-01-05 NOTE — TELEPHONE ENCOUNTER
Hugh returned call - Swelling went down the next day after returning to the 5 mg. These are BP's on the 10 mg dose: 130/83, 155/87, 150/80, 138/77, 145/81, 149/83. Does have BP log from the return to 5 mg but they are at home. Says they are running around 138/80. He did not see much change in BP from either dose. Will continue home BP checks. Call back if you want to add another med.

## 2021-01-06 NOTE — TELEPHONE ENCOUNTER
Noted - will have pt continue BP checks for 2 more weeks on Amlodipine 5 mg daily, and if he can call in with readings at that time, will see if he needs second medication added. BP goal range is <140/90, so pt is hovering both a little below and above this with reported readings so far.

## 2021-01-08 NOTE — TELEPHONE ENCOUNTER
Hugh aware we'd like more BP readings before deciding on a 2nd BP med. Understands goal of <140/90. Readings from this week: 148/79, P 72   155/83, P 65   155/87, P 64    154/79, P 66. Will send next week's BP log by Jennifer.

## 2021-01-15 ENCOUNTER — PATIENT MESSAGE (OUTPATIENT)
Dept: FAMILY MEDICINE CLINIC | Age: 61
End: 2021-01-15

## 2021-01-15 NOTE — TELEPHONE ENCOUNTER
From: Idalmis Simmons  To: Valerie Kyle DO  Sent: 1/15/2021 10:59 AM EST  Subject: Non-Urgent Medical Question    01- 3:50pm 130/80 pulse 73, 01- 0859am 143/83 pulse 62, 01-09- 5:43pm 135/79 75, 01-10-   0546am 130/82 67, 01-10 5:50 pm 148/78 69, 01-11 3:19 am 145/84 62, 01-12 3:47 am 140/83 67, 01-13 4:10   am 146/80 61, 01-13 7:13pm 136/78 64, 01-14 4:20am 142/80 61, 01-15 10:27am 140/84 67. blood pressures at different times along with pulse.

## 2021-01-25 ENCOUNTER — PATIENT MESSAGE (OUTPATIENT)
Dept: FAMILY MEDICINE CLINIC | Age: 61
End: 2021-01-25

## 2021-01-26 NOTE — TELEPHONE ENCOUNTER
From: Denice Mares  To: Annie Pedersen,   Sent: 1/25/2021 7:53 PM EST  Subject: Non-Urgent Medical Question    (1-15,) 6:49 pm, 148/82 , 74, ( 1-16), 5:12 am, 138/89, 63,. (1-17), 1:54 pm, 143/83, 65,. (1-19), 140/80, 73, (1-20), 7:42 am, 142/84, 73, (1-21), 9:14am, 141/81, 71, (1-22), 12:12 pm, 143/75, 68,. (1-23), 6:11 am, 133/86, 61, (1-24), 5:50am 143/82, 61, (1-25), 12:36 pm, (140/79, 73.

## 2021-02-01 ENCOUNTER — PATIENT MESSAGE (OUTPATIENT)
Dept: FAMILY MEDICINE CLINIC | Age: 61
End: 2021-02-01

## 2021-02-02 NOTE — TELEPHONE ENCOUNTER
From: Nathan Schmidt  To: Coreen Xavier DO  Sent: 2/1/2021 5:42 PM EST  Subject: Non-Urgent Medical Question    I will cut back on caffeine and salt, while trying to lose weight. Hopefully weather will get better after next week so i can start walking again. Yes i saw my results, thank you. Thursday,Friday, Saturday and Sunday blood pressure was . 138/78, 131/82, 136/84 and 135/79      ----- Message -----   From:Vale Obrien DO   Sent:2/1/2021 6:34 AM EST   To:Hugh Pandya   Subject:RE: Non-Urgent Medical Question      I also just wanted to make sure you had seen your labs from last month in [facility] - your PSA was normal, along with your glucose, kidney/liver function, and electrolyte levels. We will re-check those again in 1 year. Thanks,  Dr. Edward Jiang      ----- Message -----   From:Hugh Pandya   Sent:1/25/2021 7:53 PM EST   To:Vale Obrien DO   Subject:Non-Urgent Medical Question    (1-15,) 6:49 pm, 148/82 , 74, ( 1-16), 5:12 am, 138/89, 63,. (1-17), 1:54 pm, 143/83, 65,. (1-19), 140/80, 73, (1-20), 7:42 am, 142/84, 73, (1-21), 9:14am, 141/81, 71, (1-22), 12:12 pm, 143/75, 68,. (1-23), 6:11 am, 133/86, 61, (1-24), 5:50am 143/82, 61, (1-25), 12:36 pm, (140/79, 73.

## 2021-02-07 ENCOUNTER — PATIENT MESSAGE (OUTPATIENT)
Dept: FAMILY MEDICINE CLINIC | Age: 61
End: 2021-02-07

## 2021-02-08 NOTE — TELEPHONE ENCOUNTER
From: Angela Gastelum  To: Landen Mabry DO  Sent: 2/7/2021 12:28 PM EST  Subject: Non-Urgent Medical Question    (1-28), 138/78, 73, (1-29), 131/82, 65,. (1-30), 136/84, 70, (1-31), 135/79, 72, (2-1), 135/84, 67, (2-2), 134/80, 70, (2-3), 132/82, 69, (2-4), 137/83, 67, (2-5), 137/83, 73, (2-6), 134/84, 75, (2-7), 132/78, 72. Latest blood pressure, date/ pressure/pulse.

## 2021-02-19 ENCOUNTER — TELEPHONE (OUTPATIENT)
Dept: SURGERY | Age: 61
End: 2021-02-19

## 2021-02-19 NOTE — TELEPHONE ENCOUNTER
H &P Colonoscopy  Patient:Hugh Moran                 :1960  (unknown) patient has seen Dr. Don Esparza prior to procedure  PCP: Dr. Milly Gutierrez    CC:Colon cancer screening        HPI:        Colonoscopy  Abd pain: no  Anemia: no  Bloating:no  Diarrhea: no  Constipation: no  Melena: no  Hematochezia:no  Rectal Bleeding:no  Rectal/Anal Pain:no  Pruritus: no  Family history colon Cancer: no  Previous colon cancer: no  Previous Colon Polyp: no  Change in bowels: no  Decrease caliber of stool: no  Change in color of stool: no    Previous work up date: 2011-normal colonoscopy. Dr. Teresa Segura [facility]       Family History   Problem Relation Age of Onset    High Blood Pressure Mother     Diabetes Mother     Osteoporosis Mother     Heart Disease Paternal Grandmother     Stroke Paternal Grandmother     Lung Cancer Paternal Grandfather      Social History     Socioeconomic History    Marital status:      Spouse name: Not on file    Number of children: Not on file    Years of education: Not on file    Highest education level: Not on file   Occupational History    Not on file   Social Needs    Financial resource strain: Not on file    Food insecurity     Worry: Not on file     Inability: Not on file   Pick a Student needs     Medical: Not on file     Non-medical: Not on file   Tobacco Use    Smoking status: Never Smoker    Smokeless tobacco: Never Used   Substance and Sexual Activity    Alcohol use: Yes     Comment: social    Drug use: No    Sexual activity: Yes     Partners: Female     Comment: .            Lifestyle    Physical activity     Days per week: Not on file     Minutes per session: Not on file    Stress: Not on file   Relationships    Social connections     Talks on phone: Not on file     Gets together: Not on file     Attends [employer] service: Not on file     Active member of club or organization: Not on file     Attends meetings of clubs or organizations: Not on file Relationship status: Not on file    Intimate partner violence     Fear of current or ex partner: Not on file     Emotionally abused: Not on file     Physically abused: Not on file     Forced sexual activity: Not on file   Other Topics Concern    Not on file   Social History Narrative    Not on file     Past Surgical History:   Procedure Laterality Date    COLONOSCOPY  02/08/2011    PACEMAKER PLACEMENT  8/4/2006    PACEMAKER PLACEMENT  9/15/2014    battery replaced    TOENAIL EXCISION Left 09/12/78     great toenail     Past Medical History:   Diagnosis Date    Allergic rhinitis     Fainting     Granuloma, skin     elbow  1975    Measles     Mumps     Sick sinus syndrome ([facility] [address]      Current Outpatient Medications on File Prior to Visit   Medication Sig Dispense Refill    zoster recombinant adjuvanted vaccine (SHINGRIX) 50 MCG/0.5ML SUSR injection Inject 0.5 mLs into the muscle See Admin Instructions 1 dose now and repeat in 2-6 months 0.5 mL 1    omeprazole (PRILOSEC) 40 MG delayed release capsule Take 1 capsule by mouth daily 30 capsule 1    amLODIPine (NORVASC) 5 MG tablet Take 1 tablet by mouth daily 90 tablet 3     No current facility-administered medications on file prior to visit.       Allergies as of 02/19/2021    (No Known Allergies)           PEx:                ASSESSMENT:        PLAN:Colonoscopy

## 2021-02-19 NOTE — TELEPHONE ENCOUNTER
Received paperwork in the mail for patient to schedule 10 year repeat colonoscopy. Instructions mailed to patient which includes Miralax bowel prep.  Procedure scheduled on June 8, 2021 per patient request.

## 2021-02-28 ENCOUNTER — PATIENT MESSAGE (OUTPATIENT)
Dept: FAMILY MEDICINE CLINIC | Age: 61
End: 2021-02-28

## 2021-03-01 NOTE — TELEPHONE ENCOUNTER
From: Odilon Milks  To: Alejandro DO Lenin  Sent: 2/28/2021 10:19 AM EST  Subject: Non-Urgent Medical Question    2-8, 131/84, 71, (2-9) 137/83,69, (2-10), 128/79,66 (2-11) 134/82, 66, (2-12), 135/83,64, (2-13), 131/84, 63, (2-14), 138/81, 67, (2-15), 133/81, 67, (2-16), 131/80, 67, (2-17), 126/80, 74, (2-18), 130/77, 63, (2-19), 139/85, 64, (2-20), 137/77, 66, (2-21), 131/77, 68, (2-22), 131/78, 71, (2-23), 123/74, 64, (2-24), 132/82, 66, (2-25), 134/77, 60, (2-27), 130/80, 64, (2-28), 132/81, 62

## 2021-03-12 ENCOUNTER — IMMUNIZATION (OUTPATIENT)
Dept: LAB | Age: 61
End: 2021-03-12
Payer: COMMERCIAL

## 2021-03-12 PROCEDURE — 91303 COVID-19, J&J VACCINE, PF, 0.5 ML DOSE: CPT | Performed by: INTERNAL MEDICINE

## 2021-03-12 PROCEDURE — 0031A COVID-19, J&J VACCINE, PF, 0.5 ML DOSE: CPT | Performed by: INTERNAL MEDICINE

## 2021-05-02 ENCOUNTER — PATIENT MESSAGE (OUTPATIENT)
Dept: FAMILY MEDICINE CLINIC | Age: 61
End: 2021-05-02

## 2021-05-03 NOTE — TELEPHONE ENCOUNTER
From: Elsy Blackman  To: Roma Bhatt DO  Sent: 5/2/2021 2:26 PM EDT  Subject: Non-Urgent Medical Question    03/27/2021 blood pressure 137/81, pulse 68. 04/16/2021 blood pressure 129/81, pulse 67. 05/02/2021 blood pressure 129/79, pulse 73.

## 2021-05-05 ENCOUNTER — TELEPHONE (OUTPATIENT)
Dept: SURGERY | Age: 61
End: 2021-05-05

## 2021-05-05 NOTE — TELEPHONE ENCOUNTER
Patient called back stating he was offered two dates to have a colonoscopy done at [facility] with Dr Sandra Winslow.   Patient states he does not wish to have the procedure at [facility]. He states he will call back next January/February 2022 to schedule a colonoscopy

## 2021-06-28 ENCOUNTER — TELEPHONE (OUTPATIENT)
Dept: CARDIOLOGY | Age: 61
End: 2021-06-28

## 2021-06-28 DIAGNOSIS — I10 ESSENTIAL HYPERTENSION: Primary | ICD-10-CM

## 2021-06-28 RX ORDER — AMLODIPINE BESYLATE 5 MG/1
5 TABLET ORAL DAILY
Qty: 90 TABLET | Refills: 3 | Status: SHIPPED | OUTPATIENT
Start: 2021-06-28 | End: 2021-06-28

## 2021-06-28 RX ORDER — AMLODIPINE BESYLATE 5 MG/1
TABLET ORAL
Qty: 90 TABLET | Refills: 3 | Status: SHIPPED | OUTPATIENT
Start: 2021-06-28 | End: 2021-10-12 | Stop reason: SDUPTHER

## 2021-07-14 RX ORDER — AMLODIPINE BESYLATE 5 MG/1
5 TABLET ORAL DAILY
Qty: 90 TABLET | Refills: 3 | Status: SHIPPED | OUTPATIENT
Start: 2021-07-14 | End: 2021-08-23

## 2021-07-14 NOTE — TELEPHONE ENCOUNTER
[facility] called stating they have not received the prescription for this med refill. Please resend asap.     Fax:  [phone]

## 2021-08-23 ENCOUNTER — PROCEDURE VISIT (OUTPATIENT)
Dept: CARDIOLOGY | Age: 61
End: 2021-08-23
Payer: COMMERCIAL

## 2021-08-23 ENCOUNTER — OFFICE VISIT (OUTPATIENT)
Dept: CARDIOLOGY | Age: 61
End: 2021-08-23
Payer: COMMERCIAL

## 2021-08-23 VITALS
DIASTOLIC BLOOD PRESSURE: 93 MMHG | BODY MASS INDEX: 31.56 KG/M2 | HEART RATE: 62 BPM | WEIGHT: 233 LBS | HEIGHT: 72 IN | SYSTOLIC BLOOD PRESSURE: 160 MMHG

## 2021-08-23 DIAGNOSIS — I49.5 SICK SINUS SYNDROME (HCC): Primary | ICD-10-CM

## 2021-08-23 DIAGNOSIS — R94.31 ABNORMAL ECG: ICD-10-CM

## 2021-08-23 DIAGNOSIS — Z95.0 PACEMAKER: ICD-10-CM

## 2021-08-23 DIAGNOSIS — K21.9 GASTROESOPHAGEAL REFLUX DISEASE WITHOUT ESOPHAGITIS: ICD-10-CM

## 2021-08-23 DIAGNOSIS — I49.5 SICK SINUS SYNDROME (HCC): ICD-10-CM

## 2021-08-23 DIAGNOSIS — I10 ESSENTIAL HYPERTENSION: Primary | ICD-10-CM

## 2021-08-23 PROCEDURE — 99214 OFFICE O/P EST MOD 30 MIN: CPT | Performed by: INTERNAL MEDICINE

## 2021-08-23 PROCEDURE — 93280 PM DEVICE PROGR EVAL DUAL: CPT | Performed by: INTERNAL MEDICINE

## 2021-08-23 RX ORDER — LISINOPRIL 10 MG/1
10 TABLET ORAL DAILY
Qty: 90 TABLET | Refills: 1 | Status: SHIPPED | OUTPATIENT
Start: 2021-08-23 | End: 2021-10-12 | Stop reason: SDUPTHER

## 2021-10-11 ENCOUNTER — PATIENT MESSAGE (OUTPATIENT)
Dept: CARDIOLOGY | Age: 61
End: 2021-10-11

## 2021-10-12 RX ORDER — LISINOPRIL 10 MG/1
10 TABLET ORAL DAILY
Qty: 90 TABLET | Refills: 3 | Status: SHIPPED | OUTPATIENT
Start: 2021-10-12 | End: 2022-09-14

## 2021-10-12 RX ORDER — AMLODIPINE BESYLATE 5 MG/1
TABLET ORAL
Qty: 90 TABLET | Refills: 3 | Status: SHIPPED | OUTPATIENT
Start: 2021-10-12 | End: 2022-01-07

## 2021-10-12 NOTE — TELEPHONE ENCOUNTER
From: Neena Muñoz  To: Abdulaziz Tran DO  Sent: 10/11/2021 5:28 PM EDT  Subject: Prescription Question    In 20days my prescription for lisinopril 10 mg will run out. If this is the medication I'll be staying on, i have to go to [facility] to get a refill. Can we get this started so its ready when i run out, the new will be here. I will let [facility] know to contact you because they want me to renew old prescription ( amlodipine).  Thank you

## 2021-10-12 NOTE — TELEPHONE ENCOUNTER
Last Appt:  8/23/2021  Next Appt:   2/28/2022  Med verified in [facility] [address]

## 2021-11-01 LAB
CHOLESTEROL, TOTAL: 128 MG/DL
CHOLESTEROL/HDL RATIO: 3.6
HDLC SERPL-MCNC: 36 MG/DL (ref 35–70)
LDL CHOLESTEROL CALCULATED: 73 MG/DL (ref 0–160)
NONHDLC SERPL-MCNC: NORMAL MG/DL
TRIGL SERPL-MCNC: 100 MG/DL
VLDLC SERPL CALC-MCNC: NORMAL MG/DL

## 2022-01-07 ENCOUNTER — TELEPHONE (OUTPATIENT)
Dept: SURGERY | Age: 62
End: 2022-01-07

## 2022-01-07 ENCOUNTER — OFFICE VISIT (OUTPATIENT)
Dept: FAMILY MEDICINE CLINIC | Age: 62
End: 2022-01-07
Payer: COMMERCIAL

## 2022-01-07 VITALS
SYSTOLIC BLOOD PRESSURE: 118 MMHG | OXYGEN SATURATION: 96 % | TEMPERATURE: 97.7 F | BODY MASS INDEX: 32.09 KG/M2 | WEIGHT: 229.2 LBS | HEART RATE: 72 BPM | HEIGHT: 71 IN | DIASTOLIC BLOOD PRESSURE: 80 MMHG

## 2022-01-07 DIAGNOSIS — K21.9 GASTROESOPHAGEAL REFLUX DISEASE, UNSPECIFIED WHETHER ESOPHAGITIS PRESENT: ICD-10-CM

## 2022-01-07 DIAGNOSIS — Z12.5 SCREENING PSA (PROSTATE SPECIFIC ANTIGEN): ICD-10-CM

## 2022-01-07 DIAGNOSIS — Z00.00 ROUTINE MEDICAL EXAM: Primary | ICD-10-CM

## 2022-01-07 DIAGNOSIS — Z12.11 SCREENING FOR COLON CANCER: ICD-10-CM

## 2022-01-07 DIAGNOSIS — I10 ESSENTIAL HYPERTENSION: ICD-10-CM

## 2022-01-07 PROCEDURE — 99396 PREV VISIT EST AGE 40-64: CPT | Performed by: FAMILY MEDICINE

## 2022-01-07 RX ORDER — OMEPRAZOLE 20 MG/1
20 CAPSULE, DELAYED RELEASE ORAL PRN
COMMUNITY

## 2022-01-07 ASSESSMENT — PATIENT HEALTH QUESTIONNAIRE - PHQ9
2. FEELING DOWN, DEPRESSED OR HOPELESS: 0
SUM OF ALL RESPONSES TO PHQ QUESTIONS 1-9: 0
SUM OF ALL RESPONSES TO PHQ9 QUESTIONS 1 & 2: 0
1. LITTLE INTEREST OR PLEASURE IN DOING THINGS: 0
SUM OF ALL RESPONSES TO PHQ QUESTIONS 1-9: 0

## 2022-01-07 ASSESSMENT — ENCOUNTER SYMPTOMS
SHORTNESS OF BREATH: 0
COUGH: 0
BLOOD IN STOOL: 0
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
## 2022-01-07 NOTE — PATIENT INSTRUCTIONS
Medicare Wellness Visit patient outreach outcome:  Attempted outreach and message left             [place]   [phone]  Population Health Assistant   Patient Education        High Blood Pressure: Care Instructions  Overview     It's normal for blood pressure to go up and down throughout the day. But if it stays up, you have high blood pressure. Another name for high blood pressure is hypertension. Despite what a lot of people think, high blood pressure usually doesn't cause headaches or make you feel dizzy or lightheaded. It usually has no symptoms. But it does increase your risk of stroke, heart attack, and other problems. You and your doctor will talk about your risks of these problems based on your blood pressure. Your doctor will give you a goal for your blood pressure. Your goal will be based on your health and your age. Lifestyle changes, such as eating healthy and being active, are always important to help lower blood pressure. You might also take medicine to reach your blood pressure goal.  Follow-up care is a key part of your treatment and safety. Be sure to make and go to all appointments, and call your doctor if you are having problems. It's also a good idea to know your test results and keep a list of the medicines you take. How can you care for yourself at home? Medical treatment  · If you stop taking your medicine, your blood pressure will go back up. You may take one or more types of medicine to lower your blood pressure. Be safe with medicines. Take your medicine exactly as prescribed. Call your doctor if you think you are having a problem with your medicine. · Talk to your doctor before you start taking aspirin every day. Aspirin can help certain people lower their risk of a heart attack or stroke. But taking aspirin isn't right for everyone, because it can cause serious bleeding. · See your doctor regularly. You may need to see the doctor more often at first or until your blood pressure comes down.   · If you are taking blood pressure medicine, talk to your doctor before you take decongestants or anti-inflammatory medicine, such as ibuprofen. Some of these medicines can raise blood pressure. · Learn how to check your blood pressure at home. Lifestyle changes  · Stay at a healthy weight. This is especially important if you put on weight around the waist. Losing even 10 pounds can help you lower your blood pressure. · If your doctor recommends it, get more exercise. Walking is a good choice. Bit by bit, increase the amount you walk every day. Try for at least 30 minutes on most days of the week. You also may want to swim, bike, or do other activities. · Avoid or limit alcohol. Talk to your doctor about whether you can drink any alcohol. · Try to limit how much sodium you eat to less than 2,300 milligrams (mg) a day. Your doctor may ask you to try to eat less than 1,500 mg a day. · Eat plenty of fruits (such as bananas and oranges), vegetables, legumes, whole grains, and low-fat dairy products. · Lower the amount of saturated fat in your diet. Saturated fat is found in animal products such as milk, cheese, and meat. Limiting these foods may help you lose weight and also lower your risk for heart disease. · Do not smoke. Smoking increases your risk for heart attack and stroke. If you need help quitting, talk to your doctor about stop-smoking programs and medicines. These can increase your chances of quitting for good. When should you call for help? Call 911  anytime you think you may need emergency care. This may mean having symptoms that suggest that your blood pressure is causing a serious heart or blood vessel problem. Your blood pressure may be over 180/120. For example, call 911 if:    · You have symptoms of a heart attack. These may include:  ? Chest pain or pressure, or a strange feeling in the chest.  ? Sweating. ? Shortness of breath. ? Nausea or vomiting. ? Pain, pressure, or a strange feeling in the back, neck, jaw, or upper belly or in one or both shoulders or arms. ? Lightheadedness or sudden weakness.   ? A fast or irregular heartbeat.     · You have symptoms of a stroke. These may include:  ? Sudden numbness, tingling, weakness, or loss of movement in your face, arm, or leg, especially on only one side of your body. ? Sudden vision changes. ? Sudden trouble speaking. ? Sudden confusion or trouble understanding simple statements. ? Sudden problems with walking or balance. ? A sudden, severe headache that is different from past headaches.     · You have severe back or belly pain. Do not wait until your blood pressure comes down on its own. Get help right away. Call your doctor now or seek immediate care if:    · Your blood pressure is much higher than normal (such as 180/120 or higher), but you don't have symptoms.     · You think high blood pressure is causing symptoms, such as:  ? Severe headache.  ? Blurry vision. Watch closely for changes in your health, and be sure to contact your doctor if:    · Your blood pressure measures higher than your doctor recommends at least 2 times. That means the top number is higher or the bottom number is higher, or both.     · You think you may be having side effects from your blood pressure medicine. Where can you learn more? Go to https://[email or web address].[email or web address] org and sign in to your [employer] account. Enter W714 in the [place] box to learn more about \"High Blood Pressure: Care Instructions. \"     If you do not have an account, please click on the \"Sign Up Now\" link. Current as of: April 29, 2021               Content Version: 13.1  © 2006-2021 Healthwise, Incorporated. Care instructions adapted under license by [facility] ([facility]). If you have questions about a medical condition or this instruction, always ask your healthcare professional. [place] [address] any warranty or liability for your use of this information.

## 2022-01-14 ENCOUNTER — HOSPITAL ENCOUNTER (OUTPATIENT)
Dept: LAB | Age: 62
Discharge: HOME | End: 2022-01-14
Payer: COMMERCIAL

## 2022-01-14 DIAGNOSIS — Z12.5 SCREENING PSA (PROSTATE SPECIFIC ANTIGEN): ICD-10-CM

## 2022-01-14 LAB — PROSTATE SPECIFIC ANTIGEN: 1.05 UG/L

## 2022-01-14 PROCEDURE — G0103 PSA SCREENING: HCPCS

## 2022-01-14 PROCEDURE — 36415 COLL VENOUS BLD VENIPUNCTURE: CPT

## 2022-01-20 DIAGNOSIS — Z12.5 SCREENING PSA (PROSTATE SPECIFIC ANTIGEN): Primary | ICD-10-CM

## 2022-01-25 ENCOUNTER — TELEPHONE (OUTPATIENT)
Dept: SURGERY | Age: 62
End: 2022-01-25

## 2022-01-25 NOTE — TELEPHONE ENCOUNTER
Spoke with patient at this time scheduled for a Colonoscopy Wednesday 2/23/22 with Dr. Dacia Anderson at [facility]. Surgery instructions and bowel prep went over with patient at this time, denies any questions. Bowel prep sent to pharmacy of choice and all instructions mailed at this time. Acknowledges understanding of procedure and will call with any further questions.

## 2022-02-18 RX ORDER — POLYETHYLENE GLYCOL 3350, SODIUM SULFATE ANHYDROUS, SODIUM BICARBONATE, SODIUM CHLORIDE, POTASSIUM CHLORIDE 236; 22.74; 6.74; 5.86; 2.97 G/4L; G/4L; G/4L; G/4L; G/4L
4 POWDER, FOR SOLUTION ORAL ONCE
Qty: 4000 ML | Refills: 0 | Status: SHIPPED | OUTPATIENT
Start: 2022-02-18 | End: 2022-02-18

## 2022-02-18 RX ORDER — BISACODYL 5 MG
TABLET, DELAYED RELEASE (ENTERIC COATED) ORAL
Qty: 2 TABLET | Refills: 0 | Status: SHIPPED | OUTPATIENT
Start: 2022-02-18 | End: 2022-02-22

## 2022-02-21 ENCOUNTER — TELEPHONE (OUTPATIENT)
Dept: SURGERY | Age: 62
End: 2022-02-21

## 2022-02-21 RX ORDER — POLYETHYLENE GLYCOL 3350, SODIUM SULFATE ANHYDROUS, SODIUM BICARBONATE, SODIUM CHLORIDE, POTASSIUM CHLORIDE 236; 22.74; 6.74; 5.86; 2.97 G/4L; G/4L; G/4L; G/4L; G/4L
4 POWDER, FOR SOLUTION ORAL ONCE
Qty: 4000 ML | Refills: 0 | Status: SHIPPED | OUTPATIENT
Start: 2022-02-21 | End: 2022-02-21

## 2022-02-21 NOTE — TELEPHONE ENCOUNTER
LM for patient to return call. Bowel prep sent to [facility] on 2/18/22, but appears [facility] did not get filled. Prescription was resent to pharmacy.

## 2022-02-23 ENCOUNTER — ANESTHESIA (OUTPATIENT)
Dept: OPERATING ROOM | Age: 62
End: 2022-02-23
Payer: COMMERCIAL

## 2022-02-23 ENCOUNTER — ANESTHESIA EVENT (OUTPATIENT)
Dept: OPERATING ROOM | Age: 62
End: 2022-02-23
Payer: COMMERCIAL

## 2022-02-23 ENCOUNTER — HOSPITAL ENCOUNTER (OUTPATIENT)
Age: 62
Setting detail: OUTPATIENT SURGERY
Discharge: HOME | End: 2022-02-23
Attending: SURGERY | Admitting: SURGERY
Payer: COMMERCIAL

## 2022-02-23 VITALS — SYSTOLIC BLOOD PRESSURE: 117 MMHG | OXYGEN SATURATION: 98 % | DIASTOLIC BLOOD PRESSURE: 72 MMHG

## 2022-02-23 VITALS
DIASTOLIC BLOOD PRESSURE: 69 MMHG | WEIGHT: 233.4 LBS | TEMPERATURE: 98.5 F | HEART RATE: 67 BPM | SYSTOLIC BLOOD PRESSURE: 127 MMHG | OXYGEN SATURATION: 94 % | BODY MASS INDEX: 31.61 KG/M2 | RESPIRATION RATE: 14 BRPM | HEIGHT: 72 IN

## 2022-02-23 PROBLEM — Z12.11 COLON CANCER SCREENING: Status: ACTIVE | Noted: 2022-02-23

## 2022-02-23 PROCEDURE — 2580000003 HC RX 258: Performed by: SURGERY

## 2022-02-23 PROCEDURE — 3609027000 HC COLONOSCOPY: Performed by: SURGERY

## 2022-02-23 PROCEDURE — 7100000011 HC PHASE II RECOVERY - ADDTL 15 MIN: Performed by: SURGERY

## 2022-02-23 PROCEDURE — 2709999900 HC NON-CHARGEABLE SUPPLY: Performed by: SURGERY

## 2022-02-23 PROCEDURE — 2500000003 HC RX 250 WO HCPCS: Performed by: NURSE ANESTHETIST, CERTIFIED REGISTERED

## 2022-02-23 PROCEDURE — 3700000001 HC ADD 15 MINUTES (ANESTHESIA): Performed by: SURGERY

## 2022-02-23 PROCEDURE — 7100000010 HC PHASE II RECOVERY - FIRST 15 MIN: Performed by: SURGERY

## 2022-02-23 PROCEDURE — 6360000002 HC RX W HCPCS: Performed by: NURSE ANESTHETIST, CERTIFIED REGISTERED

## 2022-02-23 PROCEDURE — 3700000000 HC ANESTHESIA ATTENDED CARE: Performed by: SURGERY

## 2022-02-23 PROCEDURE — 45378 DIAGNOSTIC COLONOSCOPY: CPT | Performed by: SURGERY

## 2022-02-23 RX ORDER — PROPOFOL 10 MG/ML
INJECTION, EMULSION INTRAVENOUS PRN
Status: DISCONTINUED | OUTPATIENT
Start: 2022-02-23 | End: 2022-02-23 | Stop reason: SDUPTHER

## 2022-02-23 RX ORDER — LIDOCAINE HYDROCHLORIDE 40 MG/ML
INJECTION, SOLUTION RETROBULBAR; TOPICAL PRN
Status: DISCONTINUED | OUTPATIENT
Start: 2022-02-23 | End: 2022-02-23 | Stop reason: SDUPTHER

## 2022-02-23 RX ORDER — SODIUM CHLORIDE, SODIUM LACTATE, POTASSIUM CHLORIDE, CALCIUM CHLORIDE 600; 310; 30; 20 MG/100ML; MG/100ML; MG/100ML; MG/100ML
INJECTION, SOLUTION INTRAVENOUS CONTINUOUS
Status: DISCONTINUED | OUTPATIENT
Start: 2022-02-23 | End: 2022-02-23 | Stop reason: HOSPADM

## 2022-02-23 RX ADMIN — PROPOFOL 100 MG: 10 INJECTION, EMULSION INTRAVENOUS at 07:44

## 2022-02-23 RX ADMIN — LIDOCAINE HYDROCHLORIDE 100 MG: 40 INJECTION, SOLUTION RETROBULBAR; TOPICAL at 07:44

## 2022-02-23 RX ADMIN — PROPOFOL 100 MG: 10 INJECTION, EMULSION INTRAVENOUS at 07:52

## 2022-02-23 RX ADMIN — PROPOFOL 100 MG: 10 INJECTION, EMULSION INTRAVENOUS at 08:00

## 2022-02-23 RX ADMIN — SODIUM CHLORIDE, POTASSIUM CHLORIDE, SODIUM LACTATE AND CALCIUM CHLORIDE: 600; 310; 30; 20 INJECTION, SOLUTION INTRAVENOUS at 07:05

## 2022-02-23 ASSESSMENT — PAIN - FUNCTIONAL ASSESSMENT: PAIN_FUNCTIONAL_ASSESSMENT: 0-10

## 2022-02-23 ASSESSMENT — PAIN SCALES - GENERAL: PAINLEVEL_OUTOF10: 0

## 2022-02-23 NOTE — OP NOTE
Operative Note      Patient: Job Bender  YOB: 1960  MRN: [id number]    Date of Procedure: 2/23/2022    Pre-Op Diagnosis: colon screening    Post-Op Diagnosis: Large Internal and External Hemorrhoids, otherwise normal       Procedure(s):  Colonoscopy    Surgeon(s):  Parish Pacheco MD    Assistant:   * No surgical staff found *    Anesthesia: General    Estimated Blood Loss (mL): None    Complications: None    Specimens:   * No specimens in log *    Implants:  * No implants in log *      Drains: * No LDAs found *    Patient was brought to the endoscopy area and IV sedation was induced by the CRNA. Scope was put into his rectum and passed proximally. His bowel prep was excellent he had very little retained stool with little was there was liquid and easily suctioned away. It is notable on digital rectal examination the patient has fairly sizable prolapse internal hemorrhoids and significant external hemorrhoids. Scope passed inwards into the cecum and I was able to cannulate the terminal ileum for a few inches. From there the scope was slowly and carefully withdrawn. Use narrowband imaging and also the Endo cuff device and the scope. No additional findings are noted on the way out. Retroflexion scope in the rectum confirms the presence of large internal hemorrhoids. He should consider repeating his colonoscopy in 10 years in the absence of any new symptoms or problems.   If he is interested we be happy to see him back in the office to discuss hemorrhoid treatment    Electronically signed by Trisha Anne MD on 2/23/2022 at 8:05 AM

## 2022-02-23 NOTE — H&P
Name:  Andrew Oropeza  Age:  57 y.o.   :  1960    Physician: Khoi Parker MD       Chief Complaint: Colon Cancer Screening      HPI:  Has occasional rectal bleeding that he attributes to hemorrhoids        MEDICAL HISTORY:    Past Medical History:        Diagnosis Date    Allergic rhinitis     Fainting     Granuloma, skin     elbow      Measles     Mumps     Sick sinus syndrome [facility])        Past Surgical History:        Procedure Laterality Date    COLONOSCOPY  2011    PACEMAKER PLACEMENT  2006    PACEMAKER PLACEMENT  9/15/2014    battery replaced    TOENAIL EXCISION Left 77     great toenail       Prior to Admission medications    Medication Sig Start Date End Date Taking? Authorizing Provider   omeprazole (PRILOSEC) 20 MG delayed release capsule Take 20 mg by mouth as needed   Yes Historical Provider, MD   lisinopril (PRINIVIL;ZESTRIL) 10 MG tablet Take 1 tablet by mouth daily 10/12/21  Yes Annie Pringle MD       No Known Allergies     reports that he has never smoked. He has never used smokeless tobacco.  reports current alcohol use.     Family History   Problem Relation Age of Onset    High Blood Pressure Mother     Diabetes Mother     Osteoporosis Mother     Heart Disease Paternal Grandmother     Stroke Paternal Grandmother     Lung Cancer Paternal Grandfather             REVIEW OF SYSTEMS:  General:  negative  Eye:  negative   ENT:negative  Allergy/Immunology:  negative  Hematology/Lymphatic: negative  Lungs: negative  Cardiovascular: negative  Gastrointestinal: negative  : negative  Neurological: negative      PHYSICAL EXAM:    BP (!) 162/89   Pulse 74   Temp 98.5 °F (36.9 °C) (Temporal)   Resp 16   Ht 5' 11.5\" (1.816 m)   Wt 233 lb 6.4 oz (105.9 kg)   SpO2 98%   BMI 32.10 kg/m²       Gen: Alert and oriented x3, no acute distress, well-appearing    Eyes: PERRL, Sclera Anicteric    Head: Normocephalic, non tender     Neck: Supple, no significant adenopathy. No carotid bruits, thyroid normal size and no masses    Chest: CTA, no wheezes, no rales, no rhonchi, symmetrical    Heart: Normal rate, regular rhythm, no murmurs    Abdomen: Soft, positive bowel sounds, non tender, non distended, no masses, no hernias, no HSM, no bruits. Neuro: Normal speech, motor/sensory grossly normal bilateral    MSK: No joint tenderness, deformity, or swelling           ASSESSMENT:  1) Colon Cancer Screening    PLAN:  1) Colonoscopy - Risks and benefits of colonoscopy were discussed with Yen Ruff. In particular I discussed the possibility of incomplete colonoscopy and failure to make a diagnosis. I also discussed the risks and consequences of reactions to the sedatives, bleeding and perforation. Alternate ways of evaluating the colon including barium enema, CT colonography and sigmoidoscopy were discussed. he was also given the opportunity to have any questions answered, and encouraged to call the office with additional issues.      Electronically signed by Sonu Romano MD on 2/23/2022 at 7:33 AM

## 2022-02-23 NOTE — ANESTHESIA POSTPROCEDURE EVALUATION
Department of Anesthesiology  Postprocedure Note    Patient: Aleksandra Sheth  MRN: [id number]  [place]: 1960  Date of evaluation: 2/23/2022  Time:  8:07 AM     Procedure Summary     Date: 02/23/22 Room / Location: [address]    Anesthesia Start: 4447 Anesthesia Stop: 7373    Procedure: Colonoscopy (N/A ) Diagnosis: (colon screening)    Surgeons: Quinton Sarmiento MD Responsible Provider: JAMEEL Pinto CRNA    Anesthesia Type: MAC ASA Status: 4          Anesthesia Type: MAC    Josey Phase I: Josey Score: 10    Josey Phase II: Josey Score: 9    Last vitals: Reviewed and per EMR flowsheets.        Anesthesia Post Evaluation    Patient location during evaluation: PACU  Patient participation: complete - patient participated  Level of consciousness: awake, awake and alert and responsive to light touch  Pain score: 0  Airway patency: patent  Nausea & Vomiting: no nausea and no vomiting  Complications: no  Cardiovascular status: blood pressure returned to baseline and hemodynamically stable  Respiratory status: acceptable  Hydration status: euvolemic

## 2022-02-23 NOTE — ANESTHESIA PRE PROCEDURE
Department of Anesthesiology  Preprocedure Note       Name:  Serjio Quiroz   Age:  57 y.o.  :  1960                                          MRN:  [id number]         Date:  2022      Surgeon: Elaine Raygoza):  Jeremias Chinchilla MD    Procedure: Procedure(s):  v-Colonoscopy    Medications prior to admission:   Prior to Admission medications    Medication Sig Start Date End Date Taking?  Authorizing Provider   omeprazole (PRILOSEC) 20 MG delayed release capsule Take 20 mg by mouth as needed   Yes Historical Provider, MD   lisinopril (PRINIVIL;ZESTRIL) 10 MG tablet Take 1 tablet by mouth daily 10/12/21  Yes Jillian Archibald MD       Current medications:    Current Facility-Administered Medications   Medication Dose Route Frequency Provider Last Rate Last Admin    lactated ringers infusion   IntraVENous Continuous Jeremias Chinchilla MD           Allergies:  No Known Allergies    Problem List:    Patient Active Problem List   Diagnosis Code    Sick sinus syndrome ([facility] [address] I49.5    Pacemaker Z95.0    RBBB I45.10    Gastroesophageal reflux disease K21.9       Past Medical History:        Diagnosis Date    Allergic rhinitis     Fainting     Granuloma, skin     elbow      Measles     Mumps     Sick sinus syndrome ([facility] [address]        Past Surgical History:        Procedure Laterality Date    COLONOSCOPY  2011    PACEMAKER PLACEMENT  2006    PACEMAKER PLACEMENT  9/15/2014    battery replaced    TOENAIL EXCISION Left 77     great toenail       Social History:    Social History     Tobacco Use    Smoking status: Never Smoker    Smokeless tobacco: Never Used   Substance Use Topics    Alcohol use: Yes     Comment: social                                Counseling given: Not Answered      Vital Signs (Current):   Vitals:    22 0638   BP: (!) 162/89   Pulse: 74   Resp: 16   Temp: 36.9 °C (98.5 °F)   TempSrc: Temporal   SpO2: 98%   Weight: 233 lb 6.4 oz (105.9 kg)   Height: 5' 11.5\" (1.816 m) BP Readings from Last 3 Encounters:   02/23/22 (!) 162/89   01/07/22 118/80   08/23/21 (!) 160/93       NPO Status: Time of last liquid consumption: 2000                        Time of last solid consumption: 1930                        Date of last liquid consumption: 02/22/22 (sip with meds this am)                        Date of last solid food consumption: 02/21/22    BMI:   Wt Readings from Last 3 Encounters:   02/23/22 233 lb 6.4 oz (105.9 kg)   01/07/22 229 lb 3.2 oz (104 kg)   08/23/21 233 lb (105.7 kg)     Body mass index is 32.1 kg/m². CBC: No results found for: WBC, RBC, HGB, HCT, MCV, RDW, PLT    CMP:   Lab Results   Component Value Date     12/14/2020    K 5.0 12/14/2020     12/14/2020    CO2 28 12/14/2020    BUN 14 12/14/2020    CREATININE 0.99 12/14/2020    GFRAA >60 12/14/2020    LABGLOM >60 12/14/2020    GLUCOSE 95 12/14/2020    PROT 7.4 12/14/2020    CALCIUM 9.7 12/14/2020    BILITOT 0.59 12/14/2020    ALKPHOS 112 12/14/2020    AST 19 12/14/2020    ALT 19 12/14/2020       POC Tests: No results for input(s): POCGLU, POCNA, POCK, POCCL, POCBUN, POCHEMO, POCHCT in the last 72 hours.     Coags: No results found for: PROTIME, INR, APTT    HCG (If Applicable): No results found for: PREGTESTUR, PREGSERUM, HCG, HCGQUANT     ABGs: No results found for: PHART, PO2ART, MWJ0NHW, QTT2FIR, BEART, C2UEYQUE     Type & Screen (If Applicable):  No results found for: LABABO, LABRH    Drug/Infectious Status (If Applicable):  Lab Results   Component Value Date    HEPCAB NONREACTIVE 09/05/2017       COVID-19 Screening (If Applicable): No results found for: COVID19        Anesthesia Evaluation  Patient summary reviewed and Nursing notes reviewed no history of anesthetic complications:   Airway: Mallampati: II  TM distance: >3 FB   Neck ROM: full  Mouth opening: > = 3 FB Dental: normal exam         Pulmonary:Negative Pulmonary ROS and normal exam  breath sounds clear to auscultation            Patient did not smoke on day of surgery. Cardiovascular:    (+) pacemaker: pacemaker, dysrhythmias: paced rhythm,         Rhythm: irregular  Rate: normal           Beta Blocker:  Not on Beta Blocker         Neuro/Psych:   Negative Neuro/Psych ROS              GI/Hepatic/Renal:   (+) GERD:, bowel prep,           Endo/Other: Negative Endo/Other ROS             Pt had no PAT visit       Abdominal:       Abdomen: soft. Vascular: Other Findings:             Anesthesia Plan      MAC     ASA 4       Induction: intravenous. MIPS: Postoperative opioids intended and Prophylactic antiemetics administered. Anesthetic plan and risks discussed with patient.       Plan discussed with surgical team.                  JAMEEL Calderón - CRNA   2/23/2022

## 2022-03-15 DIAGNOSIS — Z12.11 COLON CANCER SCREENING: Primary | ICD-10-CM

## 2022-03-25 PROBLEM — Z12.11 COLON CANCER SCREENING: Status: RESOLVED | Noted: 2022-02-23 | Resolved: 2022-03-25

## 2022-06-21 ENCOUNTER — OFFICE VISIT (OUTPATIENT)
Dept: CARDIOLOGY | Age: 62
End: 2022-06-21
Payer: COMMERCIAL

## 2022-06-21 VITALS
OXYGEN SATURATION: 98 % | TEMPERATURE: 97.2 F | HEART RATE: 66 BPM | RESPIRATION RATE: 16 BRPM | WEIGHT: 227 LBS | DIASTOLIC BLOOD PRESSURE: 80 MMHG | BODY MASS INDEX: 30.75 KG/M2 | HEIGHT: 72 IN | SYSTOLIC BLOOD PRESSURE: 132 MMHG

## 2022-06-21 DIAGNOSIS — I49.5 SICK SINUS SYNDROME (HCC): ICD-10-CM

## 2022-06-21 DIAGNOSIS — I10 HYPERTENSION, ESSENTIAL: ICD-10-CM

## 2022-06-21 DIAGNOSIS — Z95.0 S/P PLACEMENT OF CARDIAC PACEMAKER: ICD-10-CM

## 2022-06-21 DIAGNOSIS — I45.10 RBBB: ICD-10-CM

## 2022-06-21 DIAGNOSIS — I10 ESSENTIAL HYPERTENSION: Primary | ICD-10-CM

## 2022-06-21 DIAGNOSIS — Z95.0 PACEMAKER: ICD-10-CM

## 2022-06-21 DIAGNOSIS — Z45.018 PACEMAKER REPROGRAMMING/CHECK: ICD-10-CM

## 2022-06-21 DIAGNOSIS — K21.9 GASTROESOPHAGEAL REFLUX DISEASE WITHOUT ESOPHAGITIS: ICD-10-CM

## 2022-06-21 PROCEDURE — 93000 ELECTROCARDIOGRAM COMPLETE: CPT | Performed by: INTERNAL MEDICINE

## 2022-06-21 PROCEDURE — 99214 OFFICE O/P EST MOD 30 MIN: CPT | Performed by: INTERNAL MEDICINE

## 2022-06-21 NOTE — PROGRESS NOTES
[facility] [address] [facility]  [facility] [address]  Dept: [phone]    CC: follow up for PPM    HPI:  The patient is a 57y.o. year old, , male is in the office for a follow up visit. No cp, sob, orthopnea, pnd, le edema. BP a bit higher. He didn't tolerate a higher dose of norvasc 10 mg po qd due to swelling. Past Medical History:   has a past medical history of Allergic rhinitis, Fainting, Granuloma, skin, Measles, Mumps, and Sick sinus syndrome ([facility] [place] [address] Past Surgical History:   has a past surgical history that includes Colonoscopy (02/08/2011); pacemaker placement (8/4/2006); pacemaker placement (9/15/2014); toenail excision (Left, 09/12/78 ); and Colonoscopy (N/A, 2/23/2022). Home Medications:  Prior to Admission medications    Medication Sig Start Date End Date Taking? Authorizing Provider   omeprazole (PRILOSEC) 20 MG delayed release capsule Take 20 mg by mouth as needed   Yes Historical Provider, MD   lisinopril (PRINIVIL;ZESTRIL) 10 MG tablet Take 1 tablet by mouth daily 10/12/21  Yes Hannah Wells MD       Allergies:  Patient has no known allergies. Social History:   reports that he has never smoked. He has never used smokeless tobacco. He reports current alcohol use. He reports that he does not use drugs. Review of Systems:  · Constitutional: there has been no unanticipated weight loss. There's been No change in energy level, No change in activity level. · Eyes: No visual changes or diplopia. No scleral icterus. · ENT: No Headaches, hearing loss or vertigo. No mouth sores or sore throat. · Cardiovascular: As above. · Respiratory: No SOB, cough or hemoptysis. · Gastrointestinal: No abdominal pain, appetite loss, blood in stools. No change in bowel or bladder habits.   · Genitourinary: No dysuria, trouble voiding, or hematuria. · Musculoskeletal:  No gait disturbance, No weakness or joint complaints. · Integumentary: No rash or pruritis. · Psychiatric: No anxiety, or depression. · Hematologic/Lymphatic: No abnormal bruising or bleeding, blood clots or swollen lymph nodes. · Allergic/Immunologic: No nasal congestion or hives. Physical Exam:  /80 (Site: Right Upper Arm, Position: Sitting, Cuff Size: Medium Adult)   Pulse 66   Temp 97.2 °F (36.2 °C) (Temporal)   Resp 16   Ht 5' 11.5\" (1.816 m)   Wt 227 lb (103 kg)   SpO2 98%   BMI 31.22 kg/m²   General appearance: alert and cooperative with exam  HEENT: Head: Normocephalic, no lesions, without obvious abnormality. Eyes: PERRL, EOMI  Ears: Not obvious deformations or lack of hearing  Neck: no carotid bruit, no JVD  Lungs: clear to auscultation bilaterally  Heart: regular rate and rhythm, S1, S2 normal, no murmur, click, rub or gallop  Abdomen: soft, non-tender; bowel sounds normal; no masses,  no organomegaly  Extremities: extremities normal, atraumatic, no cyanosis or edema  Neurologic: Mental status: Alert, oriented, thought content appropriate  Skin: WNL for age and condition, no obvious rashes or leasions      Cardiac Data:  EKG: Sinus  Rhythm   -Right bundle branch block with left axis -bifascicular block. Nuclear Stress 5/18: IMPRESSION:  1. No ischemia or infarction. 2. Normal global LV systolic function.      Labs:     Lab Results   Component Value Date    CHOL 128 11/01/2021    TRIG 100 11/01/2021    HDL 36 11/01/2021    LDLCALC 73 11/01/2021    CHOLHDLRATIO 3.6 11/01/2021     Lab Results   Component Value Date     12/14/2020    K 5.0 12/14/2020     12/14/2020    CO2 28 12/14/2020    BUN 14 12/14/2020    CREATININE 0.99 12/14/2020    GLUCOSE 95 12/14/2020    CALCIUM 9.7 12/14/2020    PROT 7.4 12/14/2020    LABALBU 4.4 12/14/2020    BILITOT 0.59 12/14/2020    ALKPHOS 112 12/14/2020    AST 19 12/14/2020    ALT 19 12/14/2020    LABGLOM >60 12/14/2020    GFRAA >60 12/14/2020       Patient Active Problem List   Diagnosis    Sick sinus syndrome ([address]    Pacemaker    RBBB    Gastroesophageal reflux disease     IMPRESSION & RECOMMENDATIONS:    SSS, S/P PPM- check on 6/20- normal function possible AT/AF <1%- longest 4 seconds, will monitor  [employer] Pacemaker. Pacer check and reprogramming. Compared to prior. Stable, chronic  HTN- Stable, chronic. Much better on meds. Reviewed his home log with him. Running in the 130s. RBBB- Stable, chronic. Asymptomatic. GERD- Stable, chronic    The patient is to continue heart healthy diet, weight loss and exercise as tolerated. Patient's medications and side effects were discussed. Medication refills were provided if needed. Follow up appointment timing was discussed. All questions and concerns were addressed to patient's satisfaction. The patient is to follow up in 6 months or sooner if necessary. Thank you for allowing me to participate in the care of this patient, please do not hesitate to call if you have any questions. [employer], [id number] [facility]  [facility]. [facility]  [phone]

## 2022-08-08 RX ORDER — LISINOPRIL 10 MG/1
10 TABLET ORAL DAILY
Qty: 90 TABLET | Refills: 3 | OUTPATIENT
Start: 2022-08-08

## 2022-09-14 RX ORDER — LISINOPRIL 10 MG/1
TABLET ORAL
Qty: 90 TABLET | Refills: 3 | Status: SHIPPED | OUTPATIENT
Start: 2022-09-14

## 2022-09-17 NOTE — PROGRESS NOTES
.   OFFICE VISIT      Patient: Khloe Beaver   : 1998 MRN: [id number]    SUBJECTIVE:  Chief Complaint   Patient presents with   • Office Visit   • Groin Pain     Groin pain for a few weeks now       A 24 year old male is here for an office visit, and for the evaluation of groin pain.      Patient has given consent to record this visit for documentation in their clinical record.      HISTORY OF PRESENT ILLNESS:    Historian: Self, Unaccompanied.    Right inguinal hernia:   Complains of pain while walk in the groin area. Had CT scan abdomen in 2018,and 2019.  Pain is the same as he had when he had a hernia in the past notes a small bulge on the area as well on the right groin.  Patient without nausea, vomiting, diarrhea constipation, melena, hematachezia and is passing gas.    Screen for STD (sexually transmitted disease):   Due for STD screening.    Additional comments:  Had injury on the right shoulder while moving a cage at [employer]. States  did not contact him. Visited specialist. Was on physical therapy. [employer] paid for undergoing physical therapy. Insurance is not covering for the bill.    Was referred to urologist in the past for Ureteritis. Currently, it is resolved.        PAST MEDICAL HISTORY:  Past Medical History:   Diagnosis Date   • No known problems      MEDICATIONS:  Current Outpatient Medications   Medication Sig   • naproxen (NAPROSYN) 500 MG tablet Take 1 pill po bid for 1-2 wks, then prn pain. Take with food.   • predniSONE (DELTASONE) 50 MG tablet Take 1 tablet by mouth every morning.   • doxycycline hyclate (VIBRAMYCIN) 100 MG capsule Take 1 capsule by mouth 2 times daily.   • traZODone (DESYREL) 50 MG tablet Take 1 tablet by mouth nightly.     No current facility-administered medications for this visit.     ALLERGIES:  ALLERGIES:  No Known Allergies  PAST SURGICAL HISTORY:  Past Surgical History:   Procedure Laterality Date   • No past surgeries       FAMILY  [address] [place] [id number]  Dept: [phone]  Loc: [phone]    CC: follow up for PPM    HPI:  The patient is a 63y.o. year old, , male is in the office for a follow up visit. No cp, sob, orthopnea, pnd, le edema. BP a bit higher. He didn't tolerate a higher dose of norvasc 10 mg po qd due to swelling. Past Medical History:   has a past medical history of Allergic rhinitis, Fainting, Granuloma, skin, Measles, Mumps, and Sick sinus syndrome ([facility] [address] Past Surgical History:   has a past surgical history that includes Colonoscopy (02/08/2011); pacemaker placement (8/4/2006); pacemaker placement (9/15/2014); and toenail excision (Left, 09/12/78 ). Home Medications:  Prior to Admission medications    Medication Sig Start Date End Date Taking? Authorizing Provider   amLODIPine (NORVASC) 5 MG tablet TAKE 1 TABLET DAILY 6/28/21  Yes Abdulaziz Tran, DO   omeprazole (PRILOSEC) 40 MG delayed release capsule Take 1 capsule by mouth daily 12/14/20  Yes Lesly Obrien, DO   zoster recombinant adjuvanted vaccine [facility]) 50 MCG/0.5ML SUSR injection Inject 0.5 mLs into the muscle See Admin Instructions 1 dose now and repeat in 2-6 months 12/14/20   Katy Lima DO       Allergies:  Patient has no known allergies. Social History:   reports that he has never smoked. He has never used smokeless tobacco. He reports current alcohol use. He reports that he does not use drugs. Review of Systems:  · Constitutional: there has been no unanticipated weight loss. There's been No change in energy level, No change in activity level. · Eyes: No visual changes or diplopia. No scleral icterus. · ENT: No Headaches, hearing loss or vertigo. No mouth sores or sore throat. · Cardiovascular: As above. · Respiratory: No SOB, cough or hemoptysis.   · Gastrointestinal: No abdominal pain, appetite loss, blood in HISTORY:  Family History   Problem Relation Age of Onset   • Diabetes Mother      SOCIAL HISTORY:  Social History     Tobacco Use   Smoking Status Never Smoker   Smokeless Tobacco Never Used     Social History     Substance and Sexual Activity   Alcohol Use No     REVIEW OF SYSTEMS:    Gastrointestinal: Per HPI  Genitourinary: Per HPI  Reproductive: Per HPI  Musculoskeletal: Per HPI    OBJECTIVE:  Vitals:    09/15/22 1612   BP: 119/71   BP Location: LUE - Left upper extremity   Patient Position: Sitting   Cuff Size: Regular   Pulse: 67   Resp: 16   Temp: 98.2 °F (36.8 °C)   TempSrc: Tympanic   SpO2: 100%   Weight: 57.2 kg (126 lb)   PainSc:  0       PHYSICAL EXAM:    Constitutional: Alert, in no acute distress and current vital signs reviewed.  Head and Face: Atraumatic, no deformities, normocephalic and normal facies.  Eyes: No discharge, normal conjunctiva, no eyelid swelling, no ptosis and the sclerae were normal. Pupils equal, round and reactive to light and accommodation, conjugate gaze and extraocular movements were intact.  ENT: Normal appearing outer ear, normal appearing nose. Examination of the tympanic membrane showed normal landmarks, normal appearing external canal. Nasal mucosa moist and pink, no nasal discharge. Normal lips. Oral mucosa pink and moist, no oral lesions. Normal appearance of tonsils. Posterior pharyngeal wall is clear.  Neck: Normal appearing neck, supple neck and no mass was seen. Thyroid not enlarged and no thyroid nodules.  Lymphatic: No anterior cervical node enlargement, no posterior cervical node enlargement and no axillary node enlargement.  Pulmonary: No respiratory distress, normal respiratory rate and effort and no accessory muscle use. Breath sounds clear to auscultation bilaterally.  Cardiovascular: Normal rate, no murmurs were heard, regular rhythm, normal S1 and S2. Edema was not present in the lower extremities.  Abdomen: Soft, non tender, non distended, normal bowel  stools. No change in bowel or bladder habits. · Genitourinary: No dysuria, trouble voiding, or hematuria. · Musculoskeletal:  No gait disturbance, No weakness or joint complaints. · Integumentary: No rash or pruritis. · Psychiatric: No anxiety, or depression. · Hematologic/Lymphatic: No abnormal bruising or bleeding, blood clots or swollen lymph nodes. · Allergic/Immunologic: No nasal congestion or hives. Physical Exam:  BP (!) 172/95   Pulse 62   Ht 5' 11.5\" (1.816 m)   Wt 233 lb (105.7 kg)   BMI 32.04 kg/m²     Constitutional and General Appearance: alert, cooperative, no distress and appears stated age  HEENT: PERRL, no cervical lymphadenopathy. No masses palpable. Normal oral mucosa  Respiratory:  · Normal excursion and expansion without use of accessory muscles  · Resp Auscultation: Good respiratory effort. No for increased work of breathing. On auscultation: clear to auscultation bilaterally  Cardiovascular:  · The apical impulse is not displaced  · Heart tones are crisp and normal. regular S1 and S2.  · Jugular venous pulsation Normal  · The carotid upstroke is normal in amplitude and contour without delay or bruit  · Peripheral pulses are symmetrical and full   Abdomen:   · No masses or tenderness  · Bowel sounds present  Extremities:  ·  No Cyanosis or Clubbing  ·  Lower extremity edema: No  ·  Skin: Warm and dry    Cardiac Data:  EKG: Sinus  Rhythm   -Right bundle branch block with left axis -bifascicular block. Nuclear Stress 5/18: IMPRESSION:  1. No ischemia or infarction. 2. Normal global LV systolic function.      Labs:     Lab Results   Component Value Date    CHOL 162 06/01/2018    TRIG 143 10/26/2020    HDL 38 10/26/2020    LDLCALC 95 06/01/2018    CHOLHDLRATIO 3.8 06/01/2018     Lab Results   Component Value Date     12/14/2020    K 5.0 12/14/2020     12/14/2020    CO2 28 12/14/2020    BUN 14 12/14/2020    CREATININE 0.99 12/14/2020    GLUCOSE 95 12/14/2020 CALCIUM 9.7 12/14/2020    PROT 7.4 12/14/2020    LABALBU 4.4 12/14/2020    BILITOT 0.59 12/14/2020    ALKPHOS 112 12/14/2020    AST 19 12/14/2020    ALT 19 12/14/2020    LABGLOM >60 12/14/2020    GFRAA >60 12/14/2020     IMPRESSION:    Patient Active Problem List   Diagnosis    Sick sinus syndrome ([address]    Pacemaker    RBBB    Gastroesophageal reflux disease     RECOMMENDATIONS:  1. SSS, S/P PPM- check on 6/20- normal function possible AT/AF <1%- longest 4 seconds, will monitor  - [facility] every 6 months  - he will get a check today    2. HYPERTENSION- BP higher.   - add lisinopril 10 mg po qd  - continue norvasc 5 (didn't tolerate the 10 due to swelling)    3. RBBB    4. GERD    The patient is to continue heart healthy diet, weight loss and exercise as tolerated. Patient's medications and side effects were discussed. Medication refills were provided if needed. Follow up appointment timing was discussed. All questions and concerns were addressed to patient's satisfaction. The patient is to follow up in 6 months or sooner if necessary. Thank you for allowing me to participate in the care of this patient, please do not hesitate to call if you have any questions. Velvet Pedro DO, [address], [address], [address] [facility]  [facility]. [facility]  [phone] sounds and no abdominal mass. No hepatomegaly and no splenomegaly. No umbilical hernia was discovered.  Musculoskeletal: Normal gait. No musculoskeletal erythema was seen, no joint swelling seen and no joint tenderness was elicited. No scoliosis. Normal range of motion. There was no joint instability noted. Muscle strength and tone were normal.  Genitourinary: Inguinal hernia was discovered. Right sided indirect   Neurological: Cranial nerves grossly intact. Normal DTRs. No sensory deficits noted. No coordination deficits. Normal gait. Muscle strength and tone were normal.  Psychiatric: Oriented to person, oriented to place and oriented to time. Alert and awake, interactive and mood/affect were appropriate. Judgment not impaired. Normal attention span. Short term memory intact.  Skin, Hair, Nails: Normal skin color and pigmentation and no rash. No foot ulcers and no skin ulcer was seen. Normal skin turgor. No clubbing or cyanosis of the fingernails.    DIAGNOSTIC STUDIES:  LAB RESULTS:  Lab Services on 09/15/2022   Component Date Value Ref Range Status   • RPR 09/15/2022 Nonreactive  Nonreactive Final   • HIV Antigen/ Antibody Combo Screen 09/15/2022 Nonreactive  Nonreactive Final   • Chlamydia trachomatis by Nucleic A* 09/15/2022 Negative  Negative Final   • Neisseria gonorrhoeae by Nucleic A* 09/15/2022 Negative  Negative Final   • Disclaimer 09/15/2022    Final                    Value:This result contains rich text formatting which cannot be displayed here.       ASSESSMENT AND PLAN:  This is a 24 year old male who presents with :  1. Right inguinal hernia    2. Screen for STD (sexually transmitted disease)        Orders Placed This Encounter   • Chlamydia/Gonorrhea by Nucleic Acid Amplification   • HIV Antigen/Antibody Screen   • RPR (Rapid Plasma Reagin) Traditional Syphilis Screen Algorithm   • SERVICE TO SURGERY GENERAL       Plan:    Right inguinal hernia:   Recommended avoiding heavy lifting.  Educated on  pathophysiology, etiology and symptoms of right inguinal hernia.  Provided referral to general surgery.  Avoid heavy lifting  Follow-up immediately ER if GI symptoms such as not passing gas or abdominal pain or nausea vomiting     Screen for STD (sexually transmitted disease):  Ordered Chlamydia/ Gonorrhea by nucleic acid amplification, RPR (RAPID PLASMA REAGIN) traditional syphilis screening algorithm, and HIV antigen and antibody screening; refer to orders.     Follow up as needed.            Total visit duration: 20:00 Minutes.      Refer to orders.  Medical compliance with plan discussed and risks of non-compliance reviewed.  Patient education completed on disease process, etiology & prognosis.  Proper usage and side effects of medications reviewed & discussed.  Patient understands and agrees with the plan.  Return to clinic as clinically indicated as discussed with patient who verbalized understanding of the plan and is in agreement with the plan.    No follow-ups on file.    I,  Dr. Radha Martin, have created a visit summary document based on the audio recording between Dr. Fran Johnson MD and this patient for the physician to review, edit as needed, and authenticate.  Creation Date: 9/22/2022      The documentation recorded by the scribe accurately and completely reflects the service(s) I personally performed and the decisions made by me.

## 2022-12-02 DIAGNOSIS — I10 ESSENTIAL HYPERTENSION: Primary | ICD-10-CM

## 2022-12-02 DIAGNOSIS — Z13.220 SCREENING FOR LIPOID DISORDERS: ICD-10-CM

## 2022-12-09 ENCOUNTER — HOSPITAL ENCOUNTER (OUTPATIENT)
Age: 62
Discharge: HOME | End: 2022-12-09
Payer: COMMERCIAL

## 2022-12-09 DIAGNOSIS — Z13.220 SCREENING FOR LIPOID DISORDERS: ICD-10-CM

## 2022-12-09 DIAGNOSIS — Z12.5 SCREENING PSA (PROSTATE SPECIFIC ANTIGEN): ICD-10-CM

## 2022-12-09 DIAGNOSIS — I10 ESSENTIAL HYPERTENSION: ICD-10-CM

## 2022-12-09 LAB
ABSOLUTE EOS #: 0.33 K/UL (ref 0–0.44)
ABSOLUTE IMMATURE GRANULOCYTE: 0.03 K/UL (ref 0–0.3)
ABSOLUTE LYMPH #: 1.45 K/UL (ref 1.1–3.7)
ABSOLUTE MONO #: 0.71 K/UL (ref 0.1–1.2)
ALBUMIN SERPL-MCNC: 4.2 G/DL (ref 3.5–5.2)
ALBUMIN/GLOBULIN RATIO: 1.4 (ref 1–2.5)
ALP BLD-CCNC: 93 U/L (ref 40–129)
ALT SERPL-CCNC: 6 U/L (ref 5–41)
ANION GAP SERPL CALCULATED.3IONS-SCNC: 8 MMOL/L (ref 9–17)
AST SERPL-CCNC: 18 U/L
BASOPHILS # BLD: 1 % (ref 0–2)
BASOPHILS ABSOLUTE: 0.04 K/UL (ref 0–0.2)
BILIRUB SERPL-MCNC: 0.5 MG/DL (ref 0.3–1.2)
BUN BLDV-MCNC: 9 MG/DL (ref 8–23)
BUN/CREAT BLD: 9 (ref 9–20)
CALCIUM SERPL-MCNC: 9.3 MG/DL (ref 8.6–10.4)
CHLORIDE BLD-SCNC: 106 MMOL/L (ref 98–107)
CHOLESTEROL/HDL RATIO: 4.1
CHOLESTEROL: 128 MG/DL
CO2: 28 MMOL/L (ref 20–31)
CREAT SERPL-MCNC: 1.01 MG/DL (ref 0.7–1.2)
EOSINOPHILS RELATIVE PERCENT: 4 % (ref 1–4)
GFR SERPL CREATININE-BSD FRML MDRD: >60 ML/MIN/1.73M2
GLUCOSE BLD-MCNC: 102 MG/DL (ref 70–99)
HCT VFR BLD CALC: 36.7 % (ref 40.7–50.3)
HDLC SERPL-MCNC: 31 MG/DL
HEMOGLOBIN: 11.1 G/DL (ref 13–17)
IMMATURE GRANULOCYTES: 0 %
LDL CHOLESTEROL: 67 MG/DL (ref 0–130)
LYMPHOCYTES # BLD: 18 % (ref 24–43)
MCH RBC QN AUTO: 22.1 PG (ref 25.2–33.5)
MCHC RBC AUTO-ENTMCNC: 30.2 G/DL (ref 25.2–33.5)
MCV RBC AUTO: 73.1 FL (ref 82.6–102.9)
MONOCYTES # BLD: 9 % (ref 3–12)
NRBC AUTOMATED: 0 PER 100 WBC
PDW BLD-RTO: 16.7 % (ref 11.8–14.4)
PLATELET # BLD: 315 K/UL (ref 138–453)
PMV BLD AUTO: 10.1 FL (ref 8.1–13.5)
POTASSIUM SERPL-SCNC: 4.6 MMOL/L (ref 3.7–5.3)
PROSTATE SPECIFIC ANTIGEN: 0.99 NG/ML
RBC # BLD: 5.02 M/UL (ref 4.21–5.77)
RBC # BLD: ABNORMAL 10*6/UL
SEG NEUTROPHILS: 68 % (ref 36–65)
SEGMENTED NEUTROPHILS ABSOLUTE COUNT: 5.53 K/UL (ref 1.5–8.1)
SODIUM BLD-SCNC: 142 MMOL/L (ref 135–144)
TOTAL PROTEIN: 7.2 G/DL (ref 6.4–8.3)
TRIGL SERPL-MCNC: 152 MG/DL
WBC # BLD: 8.1 K/UL (ref 3.5–11.3)

## 2022-12-09 PROCEDURE — 85025 COMPLETE CBC W/AUTO DIFF WBC: CPT

## 2022-12-09 PROCEDURE — 36415 COLL VENOUS BLD VENIPUNCTURE: CPT

## 2022-12-09 PROCEDURE — 80053 COMPREHEN METABOLIC PANEL: CPT

## 2022-12-09 PROCEDURE — G0103 PSA SCREENING: HCPCS

## 2022-12-09 PROCEDURE — 80061 LIPID PANEL: CPT

## 2022-12-16 ENCOUNTER — OFFICE VISIT (OUTPATIENT)
Dept: FAMILY MEDICINE CLINIC | Age: 62
End: 2022-12-16
Payer: COMMERCIAL

## 2022-12-16 VITALS
HEART RATE: 63 BPM | WEIGHT: 234 LBS | DIASTOLIC BLOOD PRESSURE: 82 MMHG | HEIGHT: 72 IN | TEMPERATURE: 98 F | SYSTOLIC BLOOD PRESSURE: 138 MMHG | OXYGEN SATURATION: 96 % | BODY MASS INDEX: 31.69 KG/M2

## 2022-12-16 DIAGNOSIS — Z00.00 ROUTINE MEDICAL EXAM: Primary | ICD-10-CM

## 2022-12-16 DIAGNOSIS — Z12.5 SCREENING PSA (PROSTATE SPECIFIC ANTIGEN): ICD-10-CM

## 2022-12-16 DIAGNOSIS — I10 ESSENTIAL HYPERTENSION: ICD-10-CM

## 2022-12-16 DIAGNOSIS — E78.6 LOW HDL (UNDER 40): ICD-10-CM

## 2022-12-16 DIAGNOSIS — R73.01 IMPAIRED FASTING GLUCOSE: ICD-10-CM

## 2022-12-16 DIAGNOSIS — D64.9 ANEMIA, UNSPECIFIED TYPE: ICD-10-CM

## 2022-12-16 PROBLEM — R55 SYNCOPE AND COLLAPSE: Status: ACTIVE | Noted: 2022-12-16

## 2022-12-16 PROCEDURE — 3074F SYST BP LT 130 MM HG: CPT | Performed by: FAMILY MEDICINE

## 2022-12-16 PROCEDURE — 99396 PREV VISIT EST AGE 40-64: CPT | Performed by: FAMILY MEDICINE

## 2022-12-16 PROCEDURE — 3078F DIAST BP <80 MM HG: CPT | Performed by: FAMILY MEDICINE

## 2022-12-16 ASSESSMENT — PATIENT HEALTH QUESTIONNAIRE - PHQ9
SUM OF ALL RESPONSES TO PHQ QUESTIONS 1-9: 0
1. LITTLE INTEREST OR PLEASURE IN DOING THINGS: 0
SUM OF ALL RESPONSES TO PHQ9 QUESTIONS 1 & 2: 0
2. FEELING DOWN, DEPRESSED OR HOPELESS: 0

## 2022-12-16 ASSESSMENT — ENCOUNTER SYMPTOMS
ABDOMINAL PAIN: 0
BLOOD IN STOOL: 1
SHORTNESS OF BREATH: 0
BACK PAIN: 1

## 2022-12-16 NOTE — PROGRESS NOTES
[address]  [address], [address]  [phone]      Nida Meraz is a 60 y.o. male who presents today for his medical conditions/complaints as noted below. Nida Meraz is c/o of Annual Exam      HPI:     Pt here today for yearly physical.    Reviewed recent lab results. BP well-controlled today - 138/82. Checking BP at home a few times per month - 131/84, 128/71, 133/78, 134/78, 139/83, 126/81, 134/79, 128/74. Had one reading at 157/81 - admits he probably hadn't sat and rested for long enough before he checked and had been running around. HR usually 60-70's. Taking Lisinopril 10 mg daily for HTN - takes this every morning. Has not yet taken this AM; last dose yesterday AM.  Tolerating well; no side effects. Seeing Cardiology once yearly for f/u SSS and HTN; pacemaker is also checked once yearly - scheduled for this on 2/27/23. Pt had colonoscopy on 2/23/22 - no polyps found; was recommended to have one again in 10 years. Did show hemorrhoids, which he deals with intermittently. Taking Omeprazole 20 mg only as needed - has not needed it as often recently. Takes it maybe a couple times per week. Gets this OTC. Pt still having R lower back/posterior R hip pain frequently. Trying not to sit on his wallet while on the  at work. Feels pain is mostly related to work, as he does not have it when he is on vacation. Doing stretches and using Blue Emu with Lidocaine as needed. Uses foot massager twice daily as needed for sensation of tightness in his feet; feels this is likely coming from his back as well. Plans to retire in 3/2025.       Past Medical History:   Diagnosis Date    Allergic rhinitis     Fainting     Granuloma, skin     elbow  1975    Measles     Mumps     Sick sinus syndrome [facility])       Past Surgical History:   Procedure Laterality Date    COLONOSCOPY  02/08/2011    COLONOSCOPY N/A 2/23/2022    Colonoscopy performed by Vicente Giron MD at [address]  8/4/2006    PACEMAKER PLACEMENT  9/15/2014    battery replaced    TOENAIL EXCISION Left 09/12/78     great toenail     Family History   Problem Relation Age of Onset    High Blood Pressure Mother     Diabetes Mother     Osteoporosis Mother     Heart Disease Paternal Grandmother     Stroke Paternal Grandmother     Lung Cancer Paternal Grandfather      Social History     Tobacco Use    Smoking status: Never    Smokeless tobacco: Never   Substance Use Topics    Alcohol use: Yes     Comment: social      Current Outpatient Medications   Medication Sig Dispense Refill    lisinopril (PRINIVIL;ZESTRIL) 10 MG tablet TAKE 1 TABLET DAILY 90 tablet 3    omeprazole (PRILOSEC) 20 MG delayed release capsule Take 20 mg by mouth as needed       No current facility-administered medications for this visit. No Known Allergies    Health Maintenance   Topic Date Due    Diabetes screen  06/01/2021    Flu vaccine (1) Never done    Depression Screen  12/16/2023    DTaP/Tdap/Td vaccine (2 - Td or Tdap) 08/04/2027    Lipids  12/09/2027    Colorectal Cancer Screen  02/23/2032    Shingles vaccine  Completed    COVID-19 Vaccine  Completed    Hepatitis C screen  Completed    HIV screen  Addressed    Hepatitis A vaccine  Aged Out    Hib vaccine  Aged Out    Meningococcal (ACWY) vaccine  Aged Out    Pneumococcal 0-64 years Vaccine  Aged Out       Subjective:      Review of Systems   Constitutional:  Negative for fever. Unexpected weight change: has gained 5 lbs since physical one year ago. Respiratory:  Negative for shortness of breath. Cardiovascular:  Negative for chest pain, palpitations and leg swelling. Gastrointestinal:  Positive for blood in stool (intermittent, with hemorrhoids). Negative for abdominal pain. Genitourinary:  Negative for dysuria and hematuria. Musculoskeletal:  Positive for back pain (R low back - frequent).    Skin:  Negative for rash and wound.   Neurological:  Negative for dizziness and light-headedness. Psychiatric/Behavioral:  Negative for dysphoric mood and suicidal ideas. The patient is not nervous/anxious. Objective:     Vitals:    12/16/22 0800   BP: 138/82   Site: Right Upper Arm   Position: Sitting   Cuff Size: Large Adult   Pulse: 63   Temp: 98 °F (36.7 °C)   TempSrc: Temporal   SpO2: 96%   Weight: 234 lb (106.1 kg)   Height: 5' 11.5\" (1.816 m)     Physical Exam  Vitals and nursing note reviewed. Constitutional:       General: He is not in acute distress. Appearance: He is well-developed. HENT:      Head: Normocephalic and atraumatic. Right Ear: Tympanic membrane, ear canal and external ear normal.      Left Ear: Tympanic membrane, ear canal and external ear normal.      Nose: Nose normal.      Mouth/Throat:      Mouth: Mucous membranes are moist.      Pharynx: Oropharynx is clear. No oropharyngeal exudate. Eyes:      Conjunctiva/sclera: Conjunctivae normal.   Cardiovascular:      Rate and Rhythm: Normal rate and regular rhythm. Heart sounds: Normal heart sounds. Pulmonary:      Effort: Pulmonary effort is normal. No respiratory distress. Breath sounds: Normal breath sounds. Abdominal:      General: Bowel sounds are normal. There is no distension. Palpations: Abdomen is soft. Tenderness: There is no abdominal tenderness. Skin:     General: Skin is warm and dry. Neurological:      Mental Status: He is alert and oriented to person, place, and time. Assessment:      1. Routine medical exam  2. Essential hypertension  -     Comprehensive Metabolic Panel; Future  3. Anemia, unspecified type  -     CBC with Auto Differential; Future  -     CBC with Auto Differential; Future  -     Iron and TIBC; Future  4. Low HDL (under 40)  -     Lipid Panel; Future  5. Impaired fasting glucose  -     Comprehensive Metabolic Panel; Future  -     Hemoglobin A1C; Future  6.  Screening PSA (prostate specific antigen)  -     PSA Screening; Future       Plan:      Declined flu vaccine today. Return in about 1 year (around 12/16/2023) for yearly physical.    Orders Placed This Encounter   Procedures    Lipid Panel     Standing Status:   Future     Standing Expiration Date:   12/16/2023     Order Specific Question:   Is Patient Fasting?/# of Hours     Answer:   15     Order Specific Question:   Has the patient fasted? Answer:   Yes    Comprehensive Metabolic Panel     Standing Status:   Future     Standing Expiration Date:   12/16/2023    CBC with Auto Differential     Standing Status:   Future     Standing Expiration Date:   12/16/2023    PSA Screening     Standing Status:   Future     Standing Expiration Date:   12/16/2023    Hemoglobin A1C     Standing Status:   Future     Standing Expiration Date:   12/16/2023    CBC with Auto Differential     Standing Status:   Future     Standing Expiration Date:   12/16/2023    Iron and TIBC     Standing Status:   Future     Standing Expiration Date:   12/16/2023     Order Specific Question:   Is Patient Fasting? Answer:   n/a     Order Specific Question:   No of Hours? Answer:   n/a     No orders of the defined types were placed in this encounter. Patient given educational materials - see patient instructions. Discussed use, benefit, and side effects of prescribed medications. All patient questions answered. Pt voiced understanding. Reviewed health maintenance.             Electronically signed by Agueda Wellington DO, DO on 12/25/2022 at 11:35 PM

## 2023-02-17 ENCOUNTER — HOSPITAL ENCOUNTER (OUTPATIENT)
Age: 63
Discharge: HOME | End: 2023-02-17
Payer: COMMERCIAL

## 2023-02-17 DIAGNOSIS — D64.9 ANEMIA, UNSPECIFIED TYPE: ICD-10-CM

## 2023-02-17 LAB
ABSOLUTE EOS #: 0.14 K/UL (ref 0–0.44)
ABSOLUTE IMMATURE GRANULOCYTE: 0.06 K/UL (ref 0–0.3)
ABSOLUTE LYMPH #: 1.41 K/UL (ref 1.1–3.7)
ABSOLUTE MONO #: 0.81 K/UL (ref 0.1–1.2)
BASOPHILS # BLD: 0 % (ref 0–2)
BASOPHILS ABSOLUTE: 0.04 K/UL (ref 0–0.2)
EOSINOPHILS RELATIVE PERCENT: 2 % (ref 1–4)
HCT VFR BLD AUTO: 39.8 % (ref 40.7–50.3)
HGB BLD-MCNC: 12.2 G/DL (ref 13–17)
IMMATURE GRANULOCYTES: 1 %
IRON SATURATION: 70 % (ref 20–55)
IRON SERPL-MCNC: 303 UG/DL (ref 59–158)
LYMPHOCYTES # BLD: 16 % (ref 24–43)
MCH RBC QN AUTO: 22.7 PG (ref 25.2–33.5)
MCHC RBC AUTO-ENTMCNC: 30.7 G/DL (ref 25.2–33.5)
MCV RBC AUTO: 74 FL (ref 82.6–102.9)
MONOCYTES # BLD: 9 % (ref 3–12)
NRBC AUTOMATED: 0 PER 100 WBC
PDW BLD-RTO: 17.3 % (ref 11.8–14.4)
PLATELET # BLD AUTO: 330 K/UL (ref 138–453)
PMV BLD AUTO: 10.5 FL (ref 8.1–13.5)
RBC # BLD: 5.38 M/UL (ref 4.21–5.77)
RBC # BLD: ABNORMAL 10*6/UL
SEG NEUTROPHILS: 72 % (ref 36–65)
SEGMENTED NEUTROPHILS ABSOLUTE COUNT: 6.49 K/UL (ref 1.5–8.1)
TIBC SERPL-MCNC: 433 UG/DL (ref 250–450)
UNSATURATED IRON BINDING CAPACITY: 130 UG/DL (ref 112–347)
WBC # BLD AUTO: 9 K/UL (ref 3.5–11.3)

## 2023-02-17 PROCEDURE — 83540 ASSAY OF IRON: CPT

## 2023-02-17 PROCEDURE — 85025 COMPLETE CBC W/AUTO DIFF WBC: CPT

## 2023-02-17 PROCEDURE — 36415 COLL VENOUS BLD VENIPUNCTURE: CPT

## 2023-02-17 PROCEDURE — 83550 IRON BINDING TEST: CPT

## 2023-02-27 ENCOUNTER — PROCEDURE VISIT (OUTPATIENT)
Dept: CARDIOLOGY | Age: 63
End: 2023-02-27
Payer: COMMERCIAL

## 2023-02-27 DIAGNOSIS — I45.10 RBBB: ICD-10-CM

## 2023-02-27 DIAGNOSIS — Z95.0 PACEMAKER: Primary | ICD-10-CM

## 2023-02-27 PROCEDURE — 93288 INTERROG EVL PM/LDLS PM IP: CPT | Performed by: INTERNAL MEDICINE

## 2023-02-28 DIAGNOSIS — D64.9 ANEMIA, UNSPECIFIED TYPE: Primary | ICD-10-CM

## 2023-08-24 NOTE — TELEPHONE ENCOUNTER
Last Appt:  2/27/2023  Next Appt:   8/28/2023  Med verified in [id number]    Pt has appt om Monday 8/28

## 2023-08-28 ENCOUNTER — PROCEDURE VISIT (OUTPATIENT)
Dept: CARDIOLOGY | Age: 63
End: 2023-08-28
Payer: COMMERCIAL

## 2023-08-28 ENCOUNTER — OFFICE VISIT (OUTPATIENT)
Dept: CARDIOLOGY | Age: 63
End: 2023-08-28
Payer: COMMERCIAL

## 2023-08-28 VITALS
DIASTOLIC BLOOD PRESSURE: 72 MMHG | SYSTOLIC BLOOD PRESSURE: 130 MMHG | HEART RATE: 68 BPM | WEIGHT: 237 LBS | BODY MASS INDEX: 32.1 KG/M2 | HEIGHT: 72 IN

## 2023-08-28 DIAGNOSIS — I45.10 RBBB: ICD-10-CM

## 2023-08-28 DIAGNOSIS — Z95.0 S/P PLACEMENT OF CARDIAC PACEMAKER: ICD-10-CM

## 2023-08-28 DIAGNOSIS — I49.5 SICK SINUS SYNDROME (HCC): ICD-10-CM

## 2023-08-28 DIAGNOSIS — Z95.0 PACEMAKER: Primary | ICD-10-CM

## 2023-08-28 DIAGNOSIS — I10 ESSENTIAL HYPERTENSION: Primary | ICD-10-CM

## 2023-08-28 DIAGNOSIS — R94.31 ABNORMAL ECG: ICD-10-CM

## 2023-08-28 PROCEDURE — 3078F DIAST BP <80 MM HG: CPT | Performed by: INTERNAL MEDICINE

## 2023-08-28 PROCEDURE — 93000 ELECTROCARDIOGRAM COMPLETE: CPT | Performed by: INTERNAL MEDICINE

## 2023-08-28 PROCEDURE — 99214 OFFICE O/P EST MOD 30 MIN: CPT | Performed by: INTERNAL MEDICINE

## 2023-08-28 PROCEDURE — 3075F SYST BP GE 130 - 139MM HG: CPT | Performed by: INTERNAL MEDICINE

## 2023-08-28 PROCEDURE — 93288 INTERROG EVL PM/LDLS PM IP: CPT | Performed by: INTERNAL MEDICINE

## 2023-08-28 RX ORDER — LISINOPRIL 10 MG/1
10 TABLET ORAL DAILY
Qty: 90 TABLET | Refills: 3 | Status: SHIPPED | OUTPATIENT
Start: 2023-08-28

## 2023-08-29 RX ORDER — LISINOPRIL 10 MG/1
10 TABLET ORAL DAILY
Qty: 90 TABLET | Refills: 3 | Status: SHIPPED | OUTPATIENT
Start: 2023-08-29

## 2023-12-01 ENCOUNTER — HOSPITAL ENCOUNTER (OUTPATIENT)
Age: 63
Discharge: HOME | End: 2023-12-01
Payer: COMMERCIAL

## 2023-12-01 DIAGNOSIS — D64.9 ANEMIA, UNSPECIFIED TYPE: ICD-10-CM

## 2023-12-01 DIAGNOSIS — I10 ESSENTIAL HYPERTENSION: ICD-10-CM

## 2023-12-01 DIAGNOSIS — E78.6 LOW HDL (UNDER 40): ICD-10-CM

## 2023-12-01 DIAGNOSIS — Z12.5 SCREENING PSA (PROSTATE SPECIFIC ANTIGEN): ICD-10-CM

## 2023-12-01 DIAGNOSIS — R73.01 IMPAIRED FASTING GLUCOSE: ICD-10-CM

## 2023-12-01 LAB
ALBUMIN SERPL-MCNC: 4.2 G/DL (ref 3.5–5.2)
ALBUMIN/GLOB SERPL: 1.4 {RATIO} (ref 1–2.5)
ALP SERPL-CCNC: 95 U/L (ref 40–129)
ALT SERPL-CCNC: 20 U/L (ref 5–41)
ANION GAP SERPL CALCULATED.3IONS-SCNC: 9 MMOL/L (ref 9–17)
AST SERPL-CCNC: 23 U/L
BASOPHILS # BLD: 0.05 K/UL (ref 0–0.2)
BASOPHILS NFR BLD: 1 % (ref 0–2)
BILIRUB SERPL-MCNC: 0.7 MG/DL (ref 0.3–1.2)
BUN SERPL-MCNC: 10 MG/DL (ref 8–23)
BUN/CREAT SERPL: 9 (ref 9–20)
CALCIUM SERPL-MCNC: 9.1 MG/DL (ref 8.6–10.4)
CHLORIDE SERPL-SCNC: 103 MMOL/L (ref 98–107)
CHOLEST SERPL-MCNC: 146 MG/DL
CHOLESTEROL/HDL RATIO: 4.4
CO2 SERPL-SCNC: 27 MMOL/L (ref 20–31)
CREAT SERPL-MCNC: 1.1 MG/DL (ref 0.7–1.2)
EOSINOPHIL # BLD: 0.42 K/UL (ref 0–0.44)
EOSINOPHILS RELATIVE PERCENT: 5 % (ref 1–4)
ERYTHROCYTE [DISTWIDTH] IN BLOOD BY AUTOMATED COUNT: 14.8 % (ref 11.8–14.4)
EST. AVERAGE GLUCOSE BLD GHB EST-MCNC: 120 MG/DL
FERRITIN SERPL-MCNC: 17 NG/ML (ref 30–400)
GFR SERPL CREATININE-BSD FRML MDRD: >60 ML/MIN/1.73M2
GLUCOSE SERPL-MCNC: 99 MG/DL (ref 70–99)
HBA1C MFR BLD: 5.8 % (ref 4–6)
HCT VFR BLD AUTO: 39.6 % (ref 40.7–50.3)
HDLC SERPL-MCNC: 33 MG/DL
HGB BLD-MCNC: 12.9 G/DL (ref 13–17)
IMM GRANULOCYTES # BLD AUTO: <0.03 K/UL (ref 0–0.3)
IMM GRANULOCYTES NFR BLD: 0 %
IRON SATN MFR SERPL: 11 % (ref 20–55)
IRON SERPL-MCNC: 44 UG/DL (ref 59–158)
LDLC SERPL CALC-MCNC: 76 MG/DL (ref 0–130)
LYMPHOCYTES NFR BLD: 1.72 K/UL (ref 1.1–3.7)
LYMPHOCYTES RELATIVE PERCENT: 21 % (ref 24–43)
MCH RBC QN AUTO: 26.1 PG (ref 25.2–33.5)
MCHC RBC AUTO-ENTMCNC: 32.6 G/DL (ref 25.2–33.5)
MCV RBC AUTO: 80 FL (ref 82.6–102.9)
MONOCYTES NFR BLD: 0.81 K/UL (ref 0.1–1.2)
MONOCYTES NFR BLD: 10 % (ref 3–12)
NEUTROPHILS NFR BLD: 63 % (ref 36–65)
NEUTS SEG NFR BLD: 5.18 K/UL (ref 1.5–8.1)
NRBC BLD-RTO: 0 PER 100 WBC
PLATELET # BLD AUTO: 300 K/UL (ref 138–453)
PMV BLD AUTO: 10.3 FL (ref 8.1–13.5)
POTASSIUM SERPL-SCNC: 4.6 MMOL/L (ref 3.7–5.3)
PROT SERPL-MCNC: 7.1 G/DL (ref 6.4–8.3)
PSA SERPL-MCNC: 1.2 NG/ML
RBC # BLD AUTO: 4.95 M/UL (ref 4.21–5.77)
RBC # BLD: ABNORMAL 10*6/UL
SODIUM SERPL-SCNC: 139 MMOL/L (ref 135–144)
TIBC SERPL-MCNC: 396 UG/DL (ref 250–450)
TRIGL SERPL-MCNC: 186 MG/DL
UNSATURATED IRON BINDING CAPACITY: 352 UG/DL (ref 112–347)
WBC OTHER # BLD: 8.2 K/UL (ref 3.5–11.3)

## 2023-12-01 PROCEDURE — 83550 IRON BINDING TEST: CPT

## 2023-12-01 PROCEDURE — 80053 COMPREHEN METABOLIC PANEL: CPT

## 2023-12-01 PROCEDURE — 85025 COMPLETE CBC W/AUTO DIFF WBC: CPT

## 2023-12-01 PROCEDURE — 36415 COLL VENOUS BLD VENIPUNCTURE: CPT

## 2023-12-01 PROCEDURE — 82728 ASSAY OF FERRITIN: CPT

## 2023-12-01 PROCEDURE — 80061 LIPID PANEL: CPT

## 2023-12-01 PROCEDURE — 83036 HEMOGLOBIN GLYCOSYLATED A1C: CPT

## 2023-12-01 PROCEDURE — G0103 PSA SCREENING: HCPCS

## 2023-12-01 PROCEDURE — 83540 ASSAY OF IRON: CPT

## 2023-12-12 ASSESSMENT — PATIENT HEALTH QUESTIONNAIRE - PHQ9
2. FEELING DOWN, DEPRESSED OR HOPELESS: NOT AT ALL
1. LITTLE INTEREST OR PLEASURE IN DOING THINGS: NOT AT ALL
SUM OF ALL RESPONSES TO PHQ9 QUESTIONS 1 & 2: 0

## 2023-12-18 ENCOUNTER — OFFICE VISIT (OUTPATIENT)
Dept: FAMILY MEDICINE CLINIC | Age: 63
End: 2023-12-18
Payer: COMMERCIAL

## 2023-12-18 VITALS
HEART RATE: 69 BPM | SYSTOLIC BLOOD PRESSURE: 152 MMHG | TEMPERATURE: 97.6 F | BODY MASS INDEX: 32.21 KG/M2 | OXYGEN SATURATION: 96 % | WEIGHT: 237.8 LBS | DIASTOLIC BLOOD PRESSURE: 88 MMHG | RESPIRATION RATE: 16 BRPM | HEIGHT: 72 IN

## 2023-12-18 DIAGNOSIS — R73.01 IMPAIRED FASTING GLUCOSE: ICD-10-CM

## 2023-12-18 DIAGNOSIS — Z12.5 SCREENING PSA (PROSTATE SPECIFIC ANTIGEN): ICD-10-CM

## 2023-12-18 DIAGNOSIS — E78.6 LOW HDL (UNDER 40): ICD-10-CM

## 2023-12-18 DIAGNOSIS — Z00.00 ROUTINE MEDICAL EXAM: Primary | ICD-10-CM

## 2023-12-18 DIAGNOSIS — I10 ESSENTIAL HYPERTENSION: ICD-10-CM

## 2023-12-18 DIAGNOSIS — R05.8 POST-VIRAL COUGH SYNDROME: ICD-10-CM

## 2023-12-18 DIAGNOSIS — D50.9 IRON DEFICIENCY ANEMIA, UNSPECIFIED IRON DEFICIENCY ANEMIA TYPE: ICD-10-CM

## 2023-12-18 DIAGNOSIS — D64.9 ANEMIA, UNSPECIFIED TYPE: ICD-10-CM

## 2023-12-18 PROCEDURE — 3079F DIAST BP 80-89 MM HG: CPT | Performed by: FAMILY MEDICINE

## 2023-12-18 PROCEDURE — 3077F SYST BP >= 140 MM HG: CPT | Performed by: FAMILY MEDICINE

## 2023-12-18 PROCEDURE — 99396 PREV VISIT EST AGE 40-64: CPT | Performed by: FAMILY MEDICINE

## 2023-12-18 RX ORDER — FERROUS SULFATE 325(65) MG
325 TABLET ORAL
Qty: 30 TABLET | Refills: 1 | Status: SHIPPED | OUTPATIENT
Start: 2023-12-18

## 2023-12-18 RX ORDER — PREDNISONE 20 MG/1
TABLET ORAL
Qty: 13 TABLET | Refills: 0 | Status: SHIPPED | OUTPATIENT
Start: 2023-12-18

## 2023-12-18 NOTE — PROGRESS NOTES
[facility]  [address] [place] [address]  [phone]      Kristin Gibbons is a 58 y.o. male who presents today for his medical conditions/complaints as noted below. Kristin Gibbons is c/o of Annual Exam and Cough      HPI:     Pt here today for yearly physical.    Reviewed results with pt during OV today - LP shows slightly higher TG's at 186 (from 152); HDL improving to 33. Iron level low at 44; iron saturation low at 11%; ferritin low at 17 - will start taking Ferrous sulfate 325 mg twice weekly. Will re-check levels again in 6 months. CBC shows improving Hgb to 12.9; otherwise normal - will re-check again in 6 months. PSA normal at 1.20 - will re-check again in 1 year. A1c normal at 5.8%; CMP normal - will re-check again in 1 year. BP at home usually 120-130's/70-80's. Checking a couple times per week, usually at different times of day. Taking Lisinopril 10 mg daily for HTN - takes this every morning. Has not yet taken this AM; last dose yesterday AM.     Seeing Cardiology every 6 months for f/u SSS and HTN. Next appt in 2/2024 for pacemaker check. He usually sees cardiology once a year except for when his pacemaker battery is getting low (down to 1-2 years per pt now). Cough  This is a recurrent problem. The current episode started more than 1 month ago. The problem has been unchanged. The problem occurs hourly. The cough is Productive of sputum. Associated symptoms include nasal congestion. Pertinent negatives include no chills, fever, headaches, rhinorrhea (had this initially, but now resolved), sore throat or shortness of breath. The symptoms are aggravated by lying down. Treatments tried: Robitussin, Mucinex DM, cough drops, Coricidin, nasal Tony's spray. The treatment provided no relief. There is no history of asthma or COPD. States it worse at night when his \"sinuses drain\". Had a runny nose when cough began 2 months ago.  COVID test

## 2024-02-26 ENCOUNTER — PROCEDURE VISIT (OUTPATIENT)
Dept: CARDIOLOGY | Age: 64
End: 2024-02-26
Payer: COMMERCIAL

## 2024-02-26 ENCOUNTER — OFFICE VISIT (OUTPATIENT)
Dept: CARDIOLOGY | Age: 64
End: 2024-02-26
Payer: COMMERCIAL

## 2024-02-26 VITALS
BODY MASS INDEX: 31.83 KG/M2 | HEIGHT: 72 IN | WEIGHT: 235 LBS | DIASTOLIC BLOOD PRESSURE: 66 MMHG | HEART RATE: 64 BPM | SYSTOLIC BLOOD PRESSURE: 134 MMHG

## 2024-02-26 DIAGNOSIS — Z45.018 PACEMAKER REPROGRAMMING/CHECK: ICD-10-CM

## 2024-02-26 DIAGNOSIS — I10 ESSENTIAL HYPERTENSION: Primary | ICD-10-CM

## 2024-02-26 DIAGNOSIS — I45.10 RBBB: ICD-10-CM

## 2024-02-26 DIAGNOSIS — I47.10 PAROXYSMAL SVT (SUPRAVENTRICULAR TACHYCARDIA): ICD-10-CM

## 2024-02-26 DIAGNOSIS — R94.31 ABNORMAL ECG: ICD-10-CM

## 2024-02-26 DIAGNOSIS — I10 HYPERTENSION, ESSENTIAL: ICD-10-CM

## 2024-02-26 DIAGNOSIS — Z95.0 PACEMAKER: Primary | ICD-10-CM

## 2024-02-26 DIAGNOSIS — I49.5 SICK SINUS SYNDROME (HCC): ICD-10-CM

## 2024-02-26 DIAGNOSIS — Z95.0 PACEMAKER: ICD-10-CM

## 2024-02-26 DIAGNOSIS — Z95.0 S/P PLACEMENT OF CARDIAC PACEMAKER: ICD-10-CM

## 2024-02-26 PROCEDURE — 93000 ELECTROCARDIOGRAM COMPLETE: CPT | Performed by: INTERNAL MEDICINE

## 2024-02-26 PROCEDURE — 93288 INTERROG EVL PM/LDLS PM IP: CPT | Performed by: INTERNAL MEDICINE

## 2024-02-26 PROCEDURE — 3078F DIAST BP <80 MM HG: CPT | Performed by: INTERNAL MEDICINE

## 2024-02-26 PROCEDURE — 99214 OFFICE O/P EST MOD 30 MIN: CPT | Performed by: INTERNAL MEDICINE

## 2024-02-26 PROCEDURE — 3075F SYST BP GE 130 - 139MM HG: CPT | Performed by: INTERNAL MEDICINE

## 2024-02-26 RX ORDER — METOPROLOL SUCCINATE 25 MG/1
25 TABLET, EXTENDED RELEASE ORAL DAILY
Qty: 90 TABLET | Refills: 3 | Status: SHIPPED | OUTPATIENT
Start: 2024-02-26

## 2024-02-26 NOTE — PATIENT INSTRUCTIONS
Central Scheduling will call you to book your testing appointment. If you do not receive a call within 48 hours, please call their number at [phone] and push option [phone]. If you have any questions or concerns, call Cardiology at [phone].  The Cardiopulmonary Testing Facility is located in the basement of [facility]. Please check in for your testing appointment [facility]

## 2024-02-26 NOTE — PROGRESS NOTES
5/18:  IMPRESSION:  1. No ischemia or infarction.  2. Normal global LV systolic function.       IMPRESSION & RECOMMENDATIONS:    1-SSS, S/P PPM-today, has about 1-2 years left.   His only atrial paced 9%, RV paced less than 1%.    Some Parox SVT  Add toprol 25 qhs   Check echo in 6 months     2-[facility] [employer] Pacemaker.  Pacer check every 6 months. See above.     3-HTN- Stable, on current meds    4-RBBB- Stable, chronic. Asymptomatic.    5-GERD- Stable, chronic    The patient is to continue heart healthy diet, weight loss and exercise as tolerated. Patient's medications and side effects were discussed. Medication refills were provided if needed. Follow up appointment timing was discussed. All questions and concerns were addressed to patient's satisfaction.     The patient is to follow up in 6 months or sooner if necessary.     Thank you for allowing me to participate in the care of this patient, please do not hesitate to call if you have any questions.    Abdulaziz Tran DO, FACC, RPVI, FIOR, ABEBA  [facility]  [facility]  [phone]

## 2024-04-01 ENCOUNTER — HOSPITAL ENCOUNTER (EMERGENCY)
Age: 64
Discharge: HOME | End: 2024-04-01
Attending: EMERGENCY MEDICINE
Payer: COMMERCIAL

## 2024-04-01 ENCOUNTER — APPOINTMENT (OUTPATIENT)
Dept: GENERAL RADIOLOGY | Age: 64
End: 2024-04-01
Payer: COMMERCIAL

## 2024-04-01 VITALS
BODY MASS INDEX: 33.6 KG/M2 | SYSTOLIC BLOOD PRESSURE: 180 MMHG | OXYGEN SATURATION: 97 % | HEART RATE: 60 BPM | RESPIRATION RATE: 18 BRPM | WEIGHT: 240 LBS | HEIGHT: 71 IN | DIASTOLIC BLOOD PRESSURE: 90 MMHG | TEMPERATURE: 97 F

## 2024-04-01 DIAGNOSIS — I10 ESSENTIAL HYPERTENSION: ICD-10-CM

## 2024-04-01 DIAGNOSIS — K59.00 CONSTIPATION, UNSPECIFIED CONSTIPATION TYPE: Primary | ICD-10-CM

## 2024-04-01 LAB
ALBUMIN SERPL-MCNC: 4.1 G/DL (ref 3.5–5.2)
ALBUMIN/GLOB SERPL: 1.5 {RATIO} (ref 1–2.5)
ALP SERPL-CCNC: 96 U/L (ref 40–129)
ALT SERPL-CCNC: 14 U/L (ref 5–41)
ANION GAP SERPL CALCULATED.3IONS-SCNC: 11 MMOL/L (ref 9–17)
AST SERPL-CCNC: 21 U/L
BASOPHILS # BLD: 0.06 K/UL (ref 0–0.2)
BASOPHILS NFR BLD: 1 % (ref 0–2)
BILIRUB SERPL-MCNC: 0.6 MG/DL (ref 0.3–1.2)
BUN SERPL-MCNC: 12 MG/DL (ref 8–23)
BUN/CREAT SERPL: 13 (ref 9–20)
CALCIUM SERPL-MCNC: 9.1 MG/DL (ref 8.6–10.4)
CHLORIDE SERPL-SCNC: 100 MMOL/L (ref 98–107)
CO2 SERPL-SCNC: 25 MMOL/L (ref 20–31)
CREAT SERPL-MCNC: 0.9 MG/DL (ref 0.7–1.2)
EKG ATRIAL RATE: 67 BPM
EKG P AXIS: 3 DEGREES
EKG P-R INTERVAL: 142 MS
EKG Q-T INTERVAL: 442 MS
EKG QRS DURATION: 150 MS
EKG QTC CALCULATION (BAZETT): 467 MS
EKG R AXIS: -36 DEGREES
EKG T AXIS: 7 DEGREES
EKG VENTRICULAR RATE: 67 BPM
EOSINOPHIL # BLD: 0.32 K/UL (ref 0–0.44)
EOSINOPHILS RELATIVE PERCENT: 4 % (ref 1–4)
ERYTHROCYTE [DISTWIDTH] IN BLOOD BY AUTOMATED COUNT: 14.7 % (ref 11.8–14.4)
GFR SERPL CREATININE-BSD FRML MDRD: >90 ML/MIN/1.73M2
GLUCOSE SERPL-MCNC: 120 MG/DL (ref 70–99)
HCT VFR BLD AUTO: 40.7 % (ref 40.7–50.3)
HGB BLD-MCNC: 13.5 G/DL (ref 13–17)
IMM GRANULOCYTES # BLD AUTO: <0.03 K/UL (ref 0–0.3)
IMM GRANULOCYTES NFR BLD: 0 %
LYMPHOCYTES NFR BLD: 1.24 K/UL (ref 1.1–3.7)
LYMPHOCYTES RELATIVE PERCENT: 17 % (ref 24–43)
MAGNESIUM SERPL-MCNC: 2.3 MG/DL (ref 1.6–2.6)
MCH RBC QN AUTO: 27.7 PG (ref 25.2–33.5)
MCHC RBC AUTO-ENTMCNC: 33.2 G/DL (ref 25.2–33.5)
MCV RBC AUTO: 83.4 FL (ref 82.6–102.9)
MONOCYTES NFR BLD: 0.64 K/UL (ref 0.1–1.2)
MONOCYTES NFR BLD: 9 % (ref 3–12)
NEUTROPHILS NFR BLD: 69 % (ref 36–65)
NEUTS SEG NFR BLD: 5.19 K/UL (ref 1.5–8.1)
NRBC BLD-RTO: 0 PER 100 WBC
PLATELET # BLD AUTO: 271 K/UL (ref 138–453)
PMV BLD AUTO: 9.7 FL (ref 8.1–13.5)
POTASSIUM SERPL-SCNC: 4 MMOL/L (ref 3.7–5.3)
PROT SERPL-MCNC: 6.9 G/DL (ref 6.4–8.3)
RBC # BLD AUTO: 4.88 M/UL (ref 4.21–5.77)
RBC # BLD: ABNORMAL 10*6/UL
SODIUM SERPL-SCNC: 136 MMOL/L (ref 135–144)
TROPONIN I SERPL HS-MCNC: 12 NG/L (ref 0–22)
WBC OTHER # BLD: 7.5 K/UL (ref 3.5–11.3)

## 2024-04-01 PROCEDURE — 36415 COLL VENOUS BLD VENIPUNCTURE: CPT

## 2024-04-01 PROCEDURE — 84484 ASSAY OF TROPONIN QUANT: CPT

## 2024-04-01 PROCEDURE — 93005 ELECTROCARDIOGRAM TRACING: CPT | Performed by: EMERGENCY MEDICINE

## 2024-04-01 PROCEDURE — 99285 EMERGENCY DEPT VISIT HI MDM: CPT

## 2024-04-01 PROCEDURE — 74022 RADEX COMPL AQT ABD SERIES: CPT

## 2024-04-01 PROCEDURE — 83735 ASSAY OF MAGNESIUM: CPT

## 2024-04-01 PROCEDURE — 80053 COMPREHEN METABOLIC PANEL: CPT

## 2024-04-01 PROCEDURE — 85025 COMPLETE CBC W/AUTO DIFF WBC: CPT

## 2024-04-01 RX ORDER — MAGNESIUM CARB/ALUMINUM HYDROX 105-160MG
296 TABLET,CHEWABLE ORAL ONCE
Qty: 296 ML | Refills: 0 | Status: SHIPPED | OUTPATIENT
Start: 2024-04-01 | End: 2024-04-01

## 2024-04-01 ASSESSMENT — PAIN SCALES - GENERAL: PAINLEVEL_OUTOF10: 1

## 2024-04-01 ASSESSMENT — ENCOUNTER SYMPTOMS
ABDOMINAL DISTENTION: 1
VOMITING: 0
SHORTNESS OF BREATH: 0
NAUSEA: 1
SORE THROAT: 0
DIARRHEA: 0

## 2024-04-01 ASSESSMENT — PAIN - FUNCTIONAL ASSESSMENT: PAIN_FUNCTIONAL_ASSESSMENT: 0-10

## 2024-04-01 ASSESSMENT — PAIN DESCRIPTION - LOCATION: LOCATION: ABDOMEN

## 2024-04-01 NOTE — ED PROVIDER NOTES
pattern.  QRS progression through precordial leads was grossly normal.  Interpretation: Sinus rhythm noted, right bundle branch block morphology, no ST segment changes or pattern that is consistent with acute ischemia or infarct.  Right bundle branch block is not new and he does have history of pacemaker placement [TS]   0533 Laboratory results show grossly normal CBC, CMP, magnesium and troponin.  X-ray is also grossly unremarkable [TS]   0539 Have discussed the results with the patient, I recommend magnesium citrate, have discussed rapid PCP follow-up as well as what to return to ER for    At this time the patient is without objective evidence of an acute process requiring hospitalization or inpatient management. They have remained hemodynamically stable and are stable for discharge with outpatient follow-up.     Standard anticipatory guidance given to patient upon discharge.  Have given them a specific time frame in which to follow-up and who to follow-up with.  I have also advised them that they should return to the emergency department if they get worse, or not getting better or develop any new or concerning symptoms.  Patient demonstrates understanding.   [TS]      ED Course User Index  [TS] Sachin Schroeder DO         PROCEDURES:  Unless otherwise noted below, none     Procedures    FINAL IMPRESSION      1. Constipation, unspecified constipation type    2. Essential hypertension          DISPOSITION/PLAN   DISPOSITION Decision To Discharge 04/01/2024 05:39:24 AM      PATIENT REFERRED TO:  Vale Obrien,   [address] [place] [id number]  [phone]    In 3 days        DISCHARGE MEDICATIONS:  New Prescriptions    MAGNESIUM CITRATE 1.745 GM/30ML SOLUTION    Take 296 mLs by mouth once for 1 dose          (Please note that portions of this note were completed with a voice recognition program.  Efforts were made to edit the dictations but occasionally words are mis-transcribed.)    Sachin Schroeder,

## 2024-04-03 ENCOUNTER — OFFICE VISIT (OUTPATIENT)
Dept: PRIMARY CARE CLINIC | Age: 64
End: 2024-04-03
Payer: COMMERCIAL

## 2024-04-03 VITALS
DIASTOLIC BLOOD PRESSURE: 86 MMHG | TEMPERATURE: 97.2 F | WEIGHT: 235.2 LBS | SYSTOLIC BLOOD PRESSURE: 138 MMHG | HEIGHT: 71 IN | OXYGEN SATURATION: 94 % | BODY MASS INDEX: 32.93 KG/M2 | HEART RATE: 67 BPM

## 2024-04-03 DIAGNOSIS — R52 BODY ACHES: ICD-10-CM

## 2024-04-03 DIAGNOSIS — R14.0 BLOATING SYMPTOM: Primary | ICD-10-CM

## 2024-04-03 LAB
INFLUENZA A ANTIGEN, POC: NEGATIVE
INFLUENZA B ANTIGEN, POC: NEGATIVE
LOT EXPIRE DATE: NORMAL
LOT KIT NUMBER: NORMAL
SARS-COV-2, POC: NORMAL
VALID INTERNAL CONTROL: NORMAL
VENDOR AND KIT NAME POC: NORMAL

## 2024-04-03 PROCEDURE — 87428 SARSCOV & INF VIR A&B AG IA: CPT

## 2024-04-03 PROCEDURE — 99213 OFFICE O/P EST LOW 20 MIN: CPT

## 2024-04-03 RX ORDER — DICYCLOMINE HYDROCHLORIDE 10 MG/1
10 CAPSULE ORAL
Qty: 120 CAPSULE | Refills: 0 | Status: SHIPPED | OUTPATIENT
Start: 2024-04-03

## 2024-04-03 RX ORDER — ONDANSETRON 4 MG/1
4 TABLET, ORALLY DISINTEGRATING ORAL ONCE
Status: DISCONTINUED | OUTPATIENT
Start: 2024-04-03 | End: 2024-04-03

## 2024-04-03 RX ORDER — ONDANSETRON 4 MG/1
4 TABLET, ORALLY DISINTEGRATING ORAL 3 TIMES DAILY PRN
Qty: 21 TABLET | Refills: 0 | Status: SHIPPED | OUTPATIENT
Start: 2024-04-03

## 2024-04-03 ASSESSMENT — ENCOUNTER SYMPTOMS
NAUSEA: 1
CHEST TIGHTNESS: 0
CONSTIPATION: 1
SHORTNESS OF BREATH: 0
DIARRHEA: 0
SINUS PAIN: 0
ABDOMINAL PAIN: 0
SINUS PRESSURE: 0
BLOOD IN STOOL: 0
BLOATING: 1

## 2024-04-03 NOTE — PATIENT INSTRUCTIONS
Clear liquid diet and increase as tolerated. I recommended the BRAT diet and increase as tolerated. Take Zofran as directed. We discussed the symptoms of dehydration. Instructed to follow up with PCP if symptoms have not improved or worsen.

## 2024-04-03 NOTE — PROGRESS NOTES
[facility]  [address] [facility] [address]  Phone: [phone]  Fax: [phone]      Hugh Pandya is a 63 y.o. male who presents to the [facility] today for his medical conditions/complaints as noted below. Hugh Pandya is c/o of Constipation (Hasn't had a normal bowel movement since last Thursday. Was seen in ER on April 1st. Also having dizziness and nausea. ) and Generalized Body Aches (Having body aches and chills starting Sunday. )          HPI:     Constipation  This is a new problem. The current episode started 1 to 4 weeks ago (x6 days ago- has had very small bowel movements throughout.). The problem is unchanged. His stool frequency is 1 time per week or less. The stool is described as pellet like. The patient is on a high fiber diet. Associated symptoms include bloating and nausea. Pertinent negatives include no abdominal pain, diarrhea, fever, melena or vomiting. Risk factors: denies any new medication changes. Treatments tried: magnesium citrate. The treatment provided mild relief. There is no history of inflammatory bowel disease or irritable bowel syndrome.       Past Medical History:   Diagnosis Date    Allergic rhinitis     Fainting     Granuloma, skin     elbow  1975    Measles     Mumps     Sick sinus syndrome (HCC)         No Known Allergies    Wt Readings from Last 3 Encounters:   04/03/24 106.7 kg (235 lb 3.2 oz)   04/01/24 108.9 kg (240 lb)   02/26/24 106.6 kg (235 lb)     BP Readings from Last 3 Encounters:   04/03/24 138/86   04/01/24 (!) 180/90   02/26/24 134/66      Temp Readings from Last 3 Encounters:   04/03/24 97.2 °F (36.2 °C) (Tympanic)   04/01/24 97 °F (36.1 °C) (Tympanic)   12/18/23 97.6 °F (36.4 °C) (Temporal)     Pulse Readings from Last 3 Encounters:   04/03/24 67   04/01/24 60   02/26/24 64     SpO2 Readings from Last 3 Encounters:   04/03/24 94%   04/01/24 97%   12/18/23 96%

## 2024-04-27 ENCOUNTER — PATIENT MESSAGE (OUTPATIENT)
Dept: CARDIOLOGY | Age: 64
End: 2024-04-27

## 2024-04-29 RX ORDER — METOPROLOL SUCCINATE 25 MG/1
25 TABLET, EXTENDED RELEASE ORAL DAILY
Qty: 90 TABLET | Refills: 3 | Status: SHIPPED | OUTPATIENT
Start: 2024-04-29

## 2024-04-29 NOTE — TELEPHONE ENCOUNTER
From: Hugh Pandya  To: Dr. Everett Tran  Sent: 4/27/2024 6:52 PM EDT  Subject: Blood pressure medication     Motoprol suc needs to go through [facility] for 90 day refill if I'm staying on this drug. Insurance won't cover cost if I go to [facility]

## 2024-06-14 ENCOUNTER — HOSPITAL ENCOUNTER (OUTPATIENT)
Age: 64
Discharge: HOME | End: 2024-06-14
Payer: COMMERCIAL

## 2024-06-14 DIAGNOSIS — D50.9 IRON DEFICIENCY ANEMIA, UNSPECIFIED IRON DEFICIENCY ANEMIA TYPE: ICD-10-CM

## 2024-06-14 LAB
BASOPHILS # BLD: 0.04 K/UL (ref 0–0.2)
BASOPHILS NFR BLD: 1 % (ref 0–2)
EOSINOPHIL # BLD: 0.48 K/UL (ref 0–0.44)
EOSINOPHILS RELATIVE PERCENT: 6 % (ref 1–4)
ERYTHROCYTE [DISTWIDTH] IN BLOOD BY AUTOMATED COUNT: 14.1 % (ref 11.8–14.4)
FERRITIN SERPL-MCNC: 23 NG/ML (ref 30–400)
HCT VFR BLD AUTO: 43.5 % (ref 40.7–50.3)
HGB BLD-MCNC: 14.5 G/DL (ref 13–17)
IMM GRANULOCYTES # BLD AUTO: 0.04 K/UL (ref 0–0.3)
IMM GRANULOCYTES NFR BLD: 1 %
IRON SATN MFR SERPL: 19 % (ref 20–55)
IRON SERPL-MCNC: 81 UG/DL (ref 61–157)
LYMPHOCYTES NFR BLD: 1.83 K/UL (ref 1.1–3.7)
LYMPHOCYTES RELATIVE PERCENT: 22 % (ref 24–43)
MCH RBC QN AUTO: 28.2 PG (ref 25.2–33.5)
MCHC RBC AUTO-ENTMCNC: 33.3 G/DL (ref 25.2–33.5)
MCV RBC AUTO: 84.6 FL (ref 82.6–102.9)
MONOCYTES NFR BLD: 0.79 K/UL (ref 0.1–1.2)
MONOCYTES NFR BLD: 9 % (ref 3–12)
NEUTROPHILS NFR BLD: 61 % (ref 36–65)
NEUTS SEG NFR BLD: 5.32 K/UL (ref 1.5–8.1)
NRBC BLD-RTO: 0 PER 100 WBC
PLATELET # BLD AUTO: 272 K/UL (ref 138–453)
PMV BLD AUTO: 9.6 FL (ref 8.1–13.5)
RBC # BLD AUTO: 5.14 M/UL (ref 4.21–5.77)
TIBC SERPL-MCNC: 427 UG/DL (ref 250–450)
UNSATURATED IRON BINDING CAPACITY: 346 UG/DL (ref 112–347)
WBC OTHER # BLD: 8.5 K/UL (ref 3.5–11.3)

## 2024-06-14 PROCEDURE — 83550 IRON BINDING TEST: CPT

## 2024-06-14 PROCEDURE — 36415 COLL VENOUS BLD VENIPUNCTURE: CPT

## 2024-06-14 PROCEDURE — 85025 COMPLETE CBC W/AUTO DIFF WBC: CPT

## 2024-06-14 PROCEDURE — 82728 ASSAY OF FERRITIN: CPT

## 2024-06-14 PROCEDURE — 83540 ASSAY OF IRON: CPT

## 2024-08-09 RX ORDER — LISINOPRIL 10 MG/1
10 TABLET ORAL DAILY
Qty: 90 TABLET | Refills: 3 | Status: SHIPPED | OUTPATIENT
Start: 2024-08-09

## 2024-08-16 ENCOUNTER — OFFICE VISIT (OUTPATIENT)
Dept: FAMILY MEDICINE CLINIC | Age: 64
End: 2024-08-16
Payer: COMMERCIAL

## 2024-08-16 ENCOUNTER — HOSPITAL ENCOUNTER (OUTPATIENT)
Dept: GENERAL RADIOLOGY | Age: 64
Discharge: HOME | End: 2024-08-16
Payer: COMMERCIAL

## 2024-08-16 VITALS
OXYGEN SATURATION: 96 % | HEART RATE: 71 BPM | HEIGHT: 71 IN | BODY MASS INDEX: 33.74 KG/M2 | WEIGHT: 241 LBS | RESPIRATION RATE: 16 BRPM | TEMPERATURE: 98.8 F

## 2024-08-16 DIAGNOSIS — M54.50 ACUTE LEFT-SIDED LOW BACK PAIN WITHOUT SCIATICA: Primary | ICD-10-CM

## 2024-08-16 DIAGNOSIS — M25.552 LEFT HIP PAIN: ICD-10-CM

## 2024-08-16 DIAGNOSIS — R05.8 POST-VIRAL COUGH SYNDROME: ICD-10-CM

## 2024-08-16 DIAGNOSIS — M54.50 ACUTE LEFT-SIDED LOW BACK PAIN WITHOUT SCIATICA: ICD-10-CM

## 2024-08-16 DIAGNOSIS — Z79.899 LONG-TERM USE OF HIGH-RISK MEDICATION: ICD-10-CM

## 2024-08-16 DIAGNOSIS — B35.1 ONYCHOMYCOSIS: ICD-10-CM

## 2024-08-16 PROCEDURE — 99214 OFFICE O/P EST MOD 30 MIN: CPT | Performed by: FAMILY MEDICINE

## 2024-08-16 PROCEDURE — 73502 X-RAY EXAM HIP UNI 2-3 VIEWS: CPT

## 2024-08-16 PROCEDURE — 72100 X-RAY EXAM L-S SPINE 2/3 VWS: CPT

## 2024-08-16 RX ORDER — TERBINAFINE HYDROCHLORIDE 250 MG/1
250 TABLET ORAL DAILY
Qty: 90 TABLET | Refills: 0 | Status: SHIPPED | OUTPATIENT
Start: 2024-08-16 | End: 2024-11-14

## 2024-08-16 RX ORDER — PREDNISONE 20 MG/1
TABLET ORAL
Qty: 15 TABLET | Refills: 0 | Status: SHIPPED | OUTPATIENT
Start: 2024-08-16

## 2024-08-16 RX ORDER — TIZANIDINE 4 MG/1
2-4 TABLET ORAL EVERY 8 HOURS PRN
Qty: 15 TABLET | Refills: 0 | Status: SHIPPED | OUTPATIENT
Start: 2024-08-16

## 2024-08-16 SDOH — ECONOMIC STABILITY: INCOME INSECURITY: HOW HARD IS IT FOR YOU TO PAY FOR THE VERY BASICS LIKE FOOD, HOUSING, MEDICAL CARE, AND HEATING?: NOT HARD AT ALL

## 2024-08-16 SDOH — ECONOMIC STABILITY: FOOD INSECURITY: WITHIN THE PAST 12 MONTHS, THE FOOD YOU BOUGHT JUST DIDN'T LAST AND YOU DIDN'T HAVE MONEY TO GET MORE.: NEVER TRUE

## 2024-08-16 SDOH — ECONOMIC STABILITY: FOOD INSECURITY: WITHIN THE PAST 12 MONTHS, YOU WORRIED THAT YOUR FOOD WOULD RUN OUT BEFORE YOU GOT MONEY TO BUY MORE.: NEVER TRUE

## 2024-08-16 ASSESSMENT — PATIENT HEALTH QUESTIONNAIRE - PHQ9
SUM OF ALL RESPONSES TO PHQ QUESTIONS 1-9: 0
SUM OF ALL RESPONSES TO PHQ9 QUESTIONS 1 & 2: 0
1. LITTLE INTEREST OR PLEASURE IN DOING THINGS: NOT AT ALL
2. FEELING DOWN, DEPRESSED OR HOPELESS: NOT AT ALL
SUM OF ALL RESPONSES TO PHQ QUESTIONS 1-9: 0

## 2024-08-16 NOTE — PROGRESS NOTES
Temp: 98.8 °F (37.1 °C)   TempSrc: Temporal   SpO2: 96%   Weight: 109.3 kg (241 lb)   Height: 1.803 m (5' 11\")     Physical Exam  Vitals and nursing note reviewed.   Constitutional:       General: He is not in acute distress.     Appearance: He is well-developed.   HENT:      Head: Normocephalic and atraumatic.      Right Ear: External ear normal.      Left Ear: External ear normal.      Nose: Nose normal.      Mouth/Throat:      Mouth: Mucous membranes are moist.      Pharynx: Oropharynx is clear. No oropharyngeal exudate.   Eyes:      Conjunctiva/sclera: Conjunctivae normal.   Cardiovascular:      Rate and Rhythm: Normal rate and regular rhythm.      Heart sounds: Normal heart sounds.   Pulmonary:      Effort: Pulmonary effort is normal. No respiratory distress.      Breath sounds: Normal breath sounds.   Abdominal:      General: Bowel sounds are normal. There is no distension.      Palpations: Abdomen is soft.      Tenderness: There is no abdominal tenderness.   Musculoskeletal:      Cervical back: Neck supple.   Skin:     General: Skin is warm and dry.   Neurological:      General: No focal deficit present.      Mental Status: He is alert and oriented to person, place, and time.   Psychiatric:         Mood and Affect: Mood normal.         Assessment:      1. Acute left-sided low back pain without sciatica  -     predniSONE (DELTASONE) 20 MG tablet; Take 2 tabs by mouth daily x 5 days, then 1 tab daily x 5 days., Disp-15 tablet, R-0Normal  -     tiZANidine (ZANAFLEX) 4 MG tablet; Take 0.5-1 tablets by mouth every 8 hours as needed (muscle pain), Disp-15 tablet, R-0Normal  -     XR LUMBAR SPINE (2-3 VIEWS); Future  2. Left hip pain  -     predniSONE (DELTASONE) 20 MG tablet; Take 2 tabs by mouth daily x 5 days, then 1 tab daily x 5 days., Disp-15 tablet, R-0Normal  -     XR HIP 2-3 VW W PELVIS LEFT; Future  3. Post-viral cough syndrome  -     predniSONE (DELTASONE) 20 MG tablet; Take 2 tabs by mouth daily x 5

## 2024-08-23 ASSESSMENT — ENCOUNTER SYMPTOMS
COUGH: 1
BACK PAIN: 1

## 2024-08-26 ENCOUNTER — OFFICE VISIT (OUTPATIENT)
Dept: CARDIOLOGY | Age: 64
End: 2024-08-26

## 2024-08-26 ENCOUNTER — PROCEDURE VISIT (OUTPATIENT)
Dept: CARDIOLOGY | Age: 64
End: 2024-08-26

## 2024-08-26 ENCOUNTER — HOSPITAL ENCOUNTER (OUTPATIENT)
Age: 64
Discharge: HOME | End: 2024-08-28
Attending: INTERNAL MEDICINE
Payer: COMMERCIAL

## 2024-08-26 ENCOUNTER — TELEPHONE (OUTPATIENT)
Dept: CARDIOLOGY | Age: 64
End: 2024-08-26

## 2024-08-26 VITALS
BODY MASS INDEX: 33.74 KG/M2 | WEIGHT: 241 LBS | HEIGHT: 71 IN | HEART RATE: 68 BPM | SYSTOLIC BLOOD PRESSURE: 134 MMHG | DIASTOLIC BLOOD PRESSURE: 70 MMHG | OXYGEN SATURATION: 96 %

## 2024-08-26 VITALS
SYSTOLIC BLOOD PRESSURE: 151 MMHG | WEIGHT: 241 LBS | DIASTOLIC BLOOD PRESSURE: 79 MMHG | HEIGHT: 71 IN | BODY MASS INDEX: 33.74 KG/M2 | HEART RATE: 66 BPM

## 2024-08-26 DIAGNOSIS — I45.10 RBBB: ICD-10-CM

## 2024-08-26 DIAGNOSIS — I49.5 SICK SINUS SYNDROME (HCC): ICD-10-CM

## 2024-08-26 DIAGNOSIS — I47.10 PAROXYSMAL SVT (SUPRAVENTRICULAR TACHYCARDIA) (HCC): ICD-10-CM

## 2024-08-26 DIAGNOSIS — Z95.0 PACEMAKER: ICD-10-CM

## 2024-08-26 DIAGNOSIS — R94.31 ABNORMAL ECG: ICD-10-CM

## 2024-08-26 DIAGNOSIS — Z95.0 PACEMAKER: Primary | ICD-10-CM

## 2024-08-26 DIAGNOSIS — I10 ESSENTIAL HYPERTENSION: ICD-10-CM

## 2024-08-26 DIAGNOSIS — Z95.0 S/P PLACEMENT OF CARDIAC PACEMAKER: ICD-10-CM

## 2024-08-26 DIAGNOSIS — I10 ESSENTIAL HYPERTENSION: Primary | ICD-10-CM

## 2024-08-26 DIAGNOSIS — I10 HYPERTENSION, ESSENTIAL: ICD-10-CM

## 2024-08-26 DIAGNOSIS — Z45.018 PACEMAKER REPROGRAMMING/CHECK: ICD-10-CM

## 2024-08-26 LAB
ECHO AO ASC DIAM: 3.5 CM
ECHO AO ASCENDING AORTA INDEX: 1.54 CM/M2
ECHO AO ROOT DIAM: 3.2 CM
ECHO AO ROOT INDEX: 1.4 CM/M2
ECHO AV AREA PEAK VELOCITY: 2.2 CM2
ECHO AV AREA/BSA PEAK VELOCITY: 1 CM2/M2
ECHO AV PEAK GRADIENT: 10 MMHG
ECHO AV PEAK VELOCITY: 1.6 M/S
ECHO AV VELOCITY RATIO: 0.69
ECHO BSA: 2.34 M2
ECHO EST RA PRESSURE: 3 MMHG
ECHO LA AREA 4C: 18.3 CM2
ECHO LA DIAMETER INDEX: 1.8 CM/M2
ECHO LA DIAMETER: 4.1 CM
ECHO LA MAJOR AXIS: 5.8 CM
ECHO LA TO AORTIC ROOT RATIO: 1.28
ECHO LA VOL MOD A4C: 48 ML (ref 18–58)
ECHO LA VOLUME INDEX MOD A4C: 21 ML/M2 (ref 16–34)
ECHO LV E' LATERAL VELOCITY: 9 CM/S
ECHO LV E' SEPTAL VELOCITY: 8 CM/S
ECHO LV EDV A4C: 74 ML
ECHO LV EDV INDEX A4C: 32 ML/M2
ECHO LV EF PHYSICIAN: 63 %
ECHO LV EJECTION FRACTION A4C: 63 %
ECHO LV ESV A4C: 27 ML
ECHO LV ESV INDEX A4C: 12 ML/M2
ECHO LV FRACTIONAL SHORTENING: 41 % (ref 28–44)
ECHO LV INTERNAL DIMENSION DIASTOLE INDEX: 2.15 CM/M2
ECHO LV INTERNAL DIMENSION DIASTOLIC: 4.9 CM (ref 4.2–5.9)
ECHO LV INTERNAL DIMENSION SYSTOLIC INDEX: 1.27 CM/M2
ECHO LV INTERNAL DIMENSION SYSTOLIC: 2.9 CM
ECHO LV ISOVOLUMETRIC RELAXATION TIME (IVRT): 85 MS
ECHO LV IVSD: 1.4 CM (ref 0.6–1)
ECHO LV MASS 2D: 239.9 G (ref 88–224)
ECHO LV MASS INDEX 2D: 105.2 G/M2 (ref 49–115)
ECHO LV POSTERIOR WALL DIASTOLIC: 1.1 CM (ref 0.6–1)
ECHO LV RELATIVE WALL THICKNESS RATIO: 0.45
ECHO LVOT AREA: 3.1 CM2
ECHO LVOT DIAM: 2 CM
ECHO LVOT PEAK GRADIENT: 5 MMHG
ECHO LVOT PEAK VELOCITY: 1.1 M/S
ECHO MV A VELOCITY: 0.76 M/S
ECHO MV E DECELERATION TIME (DT): 190 MS
ECHO MV E VELOCITY: 0.85 M/S
ECHO MV E/A RATIO: 1.12
ECHO MV E/E' LATERAL: 9.44
ECHO MV E/E' RATIO (AVERAGED): 10.03
ECHO MV E/E' SEPTAL: 10.63
ECHO MV MAX VELOCITY: 1.2 M/S
ECHO MV PEAK GRADIENT: 6 MMHG
ECHO PV MAX VELOCITY: 1.1 M/S
ECHO PV PEAK GRADIENT: 4 MMHG
ECHO RIGHT VENTRICULAR SYSTOLIC PRESSURE (RVSP): 33 MMHG
ECHO TV PEAK GRADIENT: 5 MMHG
ECHO TV REGURGITANT MAX VELOCITY: 2.74 M/S
ECHO TV REGURGITANT PEAK GRADIENT: 30 MMHG

## 2024-08-26 PROCEDURE — 93000 ELECTROCARDIOGRAM COMPLETE: CPT | Performed by: INTERNAL MEDICINE

## 2024-08-26 PROCEDURE — 99214 OFFICE O/P EST MOD 30 MIN: CPT | Performed by: INTERNAL MEDICINE

## 2024-08-26 PROCEDURE — 3075F SYST BP GE 130 - 139MM HG: CPT | Performed by: INTERNAL MEDICINE

## 2024-08-26 PROCEDURE — 3078F DIAST BP <80 MM HG: CPT | Performed by: INTERNAL MEDICINE

## 2024-08-26 PROCEDURE — 93306 TTE W/DOPPLER COMPLETE: CPT | Performed by: INTERNAL MEDICINE

## 2024-08-26 PROCEDURE — 93306 TTE W/DOPPLER COMPLETE: CPT

## 2024-08-26 NOTE — PROGRESS NOTES
[facility] [place] [address] [facility] [address]  Dept: [phone]    CC: follow up for PPM    HPI:  The patient is a 63 y.o. year old, , male is in the office for a follow up visit.  No cp.   No sob.   No palpitations.   No le edema.   States he hurt his back, and can get some back spasms, that tighten his upper torso and chest.   Had pacer check today in office.     Past Medical History:   has a past medical history of Allergic rhinitis, Fainting, Granuloma, skin, Measles, Mumps, and Sick sinus syndrome (HCC).    Past Surgical History:   has a past surgical history that includes Colonoscopy (02/08/2011); pacemaker placement (8/4/2006); pacemaker placement (9/15/2014); toenail excision (Left, 09/12/78 ); and Colonoscopy (N/A, 2/23/2022).    Home Medications:  Prior to Admission medications    Medication Sig Start Date End Date Taking? Authorizing Provider   lisinopril (PRINIVIL;ZESTRIL) 10 MG tablet TAKE 1 TABLET DAILY 8/9/24  Yes Rhonda Sandoval, APRN - CNP   metoprolol succinate (TOPROL XL) 25 MG extended release tablet Take 1 tablet by mouth daily 4/29/24  Yes Everett Tran DO   omeprazole (PRILOSEC) 20 MG delayed release capsule Take 1 capsule by mouth as needed   Yes Provider, MD Earlene   ferrous sulfate (IRON 325) 325 (65 Fe) MG tablet Take 1 tablet by mouth Twice a Week Take with food.  Patient not taking: Reported on 4/3/2024 12/18/23   Vale Obrien DO       Allergies:  Patient has no known allergies.    Social History:   reports that he has never smoked. He has never used smokeless tobacco. He reports current alcohol use. He reports that he does not use drugs.    Review of Systems:  Constitutional: there has been no unanticipated weight loss. There's been No change in energy level, No change in activity level.     Eyes: No visual changes or diplopia. No

## 2024-08-26 NOTE — TELEPHONE ENCOUNTER
Interpretation Summary      Left Ventricle: Normal left ventricular systolic function. EF by visual approximation is 63%. Left ventricle size is normal. Increased wall thickness. Normal wall motion. Normal diastolic function.    Aortic Valve: Sclerosis of the aortic valve cusps. No stenosis. AV peak velocity is 1.6 m/s.    Tricuspid Valve: Trace regurgitation. The estimated RVSP is 33 mmHg.    Left Atrium: Left atrium is dilated.    Image quality is fair.     Echo Findings    Left Ventricle Normal left ventricular systolic function. EF by visual approximation is 63%. Left ventricle size is normal. Increased wall thickness. Normal wall motion. Normal diastolic function.   Left Atrium Left atrium is dilated.   Right Ventricle Right ventricle size is normal. Normal systolic function.   Right Atrium Right atrium size is normal.   Aortic Valve Sclerosis of the aortic valve cusps. No regurgitation. No stenosis. AV peak velocity is 1.6 m/s.   Mitral Valve Valve structure is normal. No regurgitation. No stenosis noted.   Tricuspid Valve Valve structure is normal. Trace regurgitation. The estimated RVSP is 33 mmHg. No stenosis noted.   Pulmonic Valve The pulmonic valve visualization is suboptimal but appears to be functioning normally. Physiologically normal regurgitation. No stenosis noted.   Aorta Normal sized aortic root and ascending aorta.   Pericardium No pericardial effusion.     [facility]

## 2024-09-04 ENCOUNTER — PATIENT MESSAGE (OUTPATIENT)
Dept: FAMILY MEDICINE CLINIC | Age: 64
End: 2024-09-04

## 2024-09-06 ENCOUNTER — HOSPITAL ENCOUNTER (OUTPATIENT)
Age: 64
Discharge: HOME | End: 2024-09-06
Payer: COMMERCIAL

## 2024-09-06 DIAGNOSIS — Z79.899 LONG-TERM USE OF HIGH-RISK MEDICATION: ICD-10-CM

## 2024-09-06 LAB
ALBUMIN SERPL-MCNC: 4.2 G/DL (ref 3.5–5.2)
ALBUMIN/GLOB SERPL: 1.5 {RATIO} (ref 1–2.5)
ALP SERPL-CCNC: 93 U/L (ref 40–129)
ALT SERPL-CCNC: 17 U/L (ref 5–41)
AST SERPL-CCNC: 20 U/L
BILIRUB DIRECT SERPL-MCNC: 0.2 MG/DL
BILIRUB INDIRECT SERPL-MCNC: 0.5 MG/DL (ref 0–1)
BILIRUB SERPL-MCNC: 0.7 MG/DL (ref 0.3–1.2)
GLOBULIN SER CALC-MCNC: 2.8 G/DL (ref 1.5–3.8)
PROT SERPL-MCNC: 7 G/DL (ref 6.4–8.3)

## 2024-09-06 PROCEDURE — 36415 COLL VENOUS BLD VENIPUNCTURE: CPT

## 2024-09-06 PROCEDURE — 80076 HEPATIC FUNCTION PANEL: CPT

## 2024-12-09 ENCOUNTER — HOSPITAL ENCOUNTER (OUTPATIENT)
Age: 64
Discharge: HOME | End: 2024-12-09
Payer: MEDICARE

## 2024-12-09 DIAGNOSIS — Z12.5 SCREENING PSA (PROSTATE SPECIFIC ANTIGEN): ICD-10-CM

## 2024-12-09 DIAGNOSIS — R73.01 IMPAIRED FASTING GLUCOSE: ICD-10-CM

## 2024-12-09 DIAGNOSIS — E78.6 LOW HDL (UNDER 40): ICD-10-CM

## 2024-12-09 LAB
ALBUMIN SERPL-MCNC: 4.3 G/DL (ref 3.5–5.2)
ALBUMIN/GLOB SERPL: 1.4 {RATIO} (ref 1–2.5)
ALP SERPL-CCNC: 116 U/L (ref 40–129)
ALT SERPL-CCNC: 13 U/L (ref 5–41)
ANION GAP SERPL CALCULATED.3IONS-SCNC: 10 MMOL/L (ref 9–17)
AST SERPL-CCNC: 17 U/L
BILIRUB SERPL-MCNC: 0.6 MG/DL (ref 0.3–1.2)
BUN SERPL-MCNC: 12 MG/DL (ref 8–23)
BUN/CREAT SERPL: 12 (ref 9–20)
CALCIUM SERPL-MCNC: 9.1 MG/DL (ref 8.6–10.4)
CHLORIDE SERPL-SCNC: 104 MMOL/L (ref 98–107)
CHOLEST SERPL-MCNC: 171 MG/DL (ref 0–199)
CHOLESTEROL/HDL RATIO: 4.6
CO2 SERPL-SCNC: 27 MMOL/L (ref 20–31)
CREAT SERPL-MCNC: 1 MG/DL (ref 0.7–1.2)
GFR, ESTIMATED: 84 ML/MIN/1.73M2
GLUCOSE SERPL-MCNC: 109 MG/DL (ref 70–99)
HDLC SERPL-MCNC: 37 MG/DL
LDLC SERPL CALC-MCNC: 96 MG/DL (ref 0–100)
POTASSIUM SERPL-SCNC: 4.7 MMOL/L (ref 3.7–5.3)
PROT SERPL-MCNC: 7.4 G/DL (ref 6.4–8.3)
PSA SERPL-MCNC: 1.4 NG/ML (ref 0–4)
SODIUM SERPL-SCNC: 141 MMOL/L (ref 135–144)
TRIGL SERPL-MCNC: 188 MG/DL
VLDLC SERPL CALC-MCNC: 38 MG/DL (ref 1–30)

## 2024-12-09 PROCEDURE — 80061 LIPID PANEL: CPT

## 2024-12-09 PROCEDURE — G0103 PSA SCREENING: HCPCS

## 2024-12-09 PROCEDURE — 36415 COLL VENOUS BLD VENIPUNCTURE: CPT

## 2024-12-09 PROCEDURE — 80053 COMPREHEN METABOLIC PANEL: CPT

## 2024-12-20 ENCOUNTER — OFFICE VISIT (OUTPATIENT)
Dept: FAMILY MEDICINE CLINIC | Age: 64
End: 2024-12-20
Payer: MEDICARE

## 2024-12-20 VITALS
SYSTOLIC BLOOD PRESSURE: 134 MMHG | HEIGHT: 71 IN | DIASTOLIC BLOOD PRESSURE: 78 MMHG | RESPIRATION RATE: 16 BRPM | OXYGEN SATURATION: 96 % | BODY MASS INDEX: 33.95 KG/M2 | TEMPERATURE: 97.5 F | HEART RATE: 70 BPM | WEIGHT: 242.5 LBS

## 2024-12-20 DIAGNOSIS — R73.01 IMPAIRED FASTING GLUCOSE: ICD-10-CM

## 2024-12-20 DIAGNOSIS — D50.9 IRON DEFICIENCY ANEMIA, UNSPECIFIED IRON DEFICIENCY ANEMIA TYPE: ICD-10-CM

## 2024-12-20 DIAGNOSIS — Z23 NEED FOR COVID-19 VACCINE: ICD-10-CM

## 2024-12-20 DIAGNOSIS — E78.6 LOW HDL (UNDER 40): ICD-10-CM

## 2024-12-20 DIAGNOSIS — D48.5 NEOPLASM OF UNCERTAIN BEHAVIOR OF SKIN OF CHEEK: ICD-10-CM

## 2024-12-20 DIAGNOSIS — M16.12 PRIMARY OSTEOARTHRITIS OF LEFT HIP: ICD-10-CM

## 2024-12-20 DIAGNOSIS — I10 ESSENTIAL HYPERTENSION: Primary | ICD-10-CM

## 2024-12-20 DIAGNOSIS — Z12.5 SCREENING PSA (PROSTATE SPECIFIC ANTIGEN): ICD-10-CM

## 2024-12-20 PROBLEM — R55 SYNCOPE AND COLLAPSE: Status: RESOLVED | Noted: 2022-12-16 | Resolved: 2024-12-20

## 2024-12-20 PROCEDURE — 91320 SARSCV2 VAC 30MCG TRS-SUC IM: CPT | Performed by: FAMILY MEDICINE

## 2024-12-20 NOTE — PROGRESS NOTES
Requested Specialty:   Orthopedic Surgery     Number of Visits Requested:   1     No orders of the defined types were placed in this encounter.          Patient given educational materials - see patient instructions.  Discussed use, benefit, and side effects of prescribed medications.  All patient questions answered.  Pt voiced understanding.  Reviewed health maintenance.            Electronically signed by Vale Obrien DO, DO on 12/31/2024 at 12:02 AM

## 2025-01-07 ENCOUNTER — OFFICE VISIT (OUTPATIENT)
Dept: ORTHOPEDIC SURGERY | Age: 65
End: 2025-01-07
Payer: MEDICARE

## 2025-01-07 VITALS
HEIGHT: 71 IN | WEIGHT: 242 LBS | BODY MASS INDEX: 33.88 KG/M2 | HEART RATE: 60 BPM | SYSTOLIC BLOOD PRESSURE: 152 MMHG | DIASTOLIC BLOOD PRESSURE: 82 MMHG

## 2025-01-07 DIAGNOSIS — M25.552 LEFT HIP PAIN: Primary | ICD-10-CM

## 2025-01-07 DIAGNOSIS — M16.12 PRIMARY OSTEOARTHRITIS OF LEFT HIP: ICD-10-CM

## 2025-01-07 PROCEDURE — 99202 OFFICE O/P NEW SF 15 MIN: CPT | Performed by: NURSE PRACTITIONER

## 2025-01-07 RX ORDER — MELOXICAM 15 MG/1
15 TABLET ORAL DAILY
Qty: 30 TABLET | Refills: 3 | Status: SHIPPED | OUTPATIENT
Start: 2025-01-07

## 2025-01-07 NOTE — PROGRESS NOTES
osteoarthritis within the left hip.  Moderate osteoarthritis in the right hip.  X-rays of the lumbar spine do show degenerative disc changes to the lumbar spine.       Diagnosis Orders   1. Left hip pain        2. Primary osteoarthritis of left hip              PLAN:  Plan at this time did discuss options with patient.  He would like to try Mobic to take as needed for pain.  We will see him back as needed if he has increased pain or other symptoms.  Activity as tolerated weightbearing as tolerated.    No orders of the defined types were placed in this encounter.       Procedure:        Electronically signed by JAMEEL Roper CNP on 1/7/2025 at 3:09 PM

## 2025-02-14 ENCOUNTER — APPOINTMENT (OUTPATIENT)
Dept: GENERAL RADIOLOGY | Age: 65
DRG: 195 | End: 2025-02-14
Payer: MEDICARE

## 2025-02-14 ENCOUNTER — HOSPITAL ENCOUNTER (INPATIENT)
Age: 65
LOS: 1 days | Discharge: HOME | DRG: 195 | End: 2025-02-15
Attending: EMERGENCY MEDICINE | Admitting: INTERNAL MEDICINE
Payer: MEDICARE

## 2025-02-14 ENCOUNTER — OFFICE VISIT (OUTPATIENT)
Dept: PRIMARY CARE CLINIC | Age: 65
End: 2025-02-14
Payer: MEDICARE

## 2025-02-14 VITALS
BODY MASS INDEX: 34.58 KG/M2 | OXYGEN SATURATION: 97 % | WEIGHT: 247 LBS | RESPIRATION RATE: 20 BRPM | SYSTOLIC BLOOD PRESSURE: 166 MMHG | HEIGHT: 71 IN | TEMPERATURE: 98.3 F | DIASTOLIC BLOOD PRESSURE: 92 MMHG | HEART RATE: 94 BPM

## 2025-02-14 DIAGNOSIS — R55 VASOVAGAL EPISODE: ICD-10-CM

## 2025-02-14 DIAGNOSIS — R55 VASOVAGAL SYNCOPE: ICD-10-CM

## 2025-02-14 DIAGNOSIS — J18.9 PNEUMONIA OF BOTH LUNGS DUE TO INFECTIOUS ORGANISM, UNSPECIFIED PART OF LUNG: Primary | ICD-10-CM

## 2025-02-14 DIAGNOSIS — R55 SYNCOPE AND COLLAPSE: ICD-10-CM

## 2025-02-14 DIAGNOSIS — R42 LIGHTHEADEDNESS: ICD-10-CM

## 2025-02-14 DIAGNOSIS — R52 BODY ACHES: Primary | ICD-10-CM

## 2025-02-14 PROBLEM — M54.50 LEFT-SIDED LOW BACK PAIN WITHOUT SCIATICA: Status: ACTIVE | Noted: 2025-02-14

## 2025-02-14 PROBLEM — I10 ESSENTIAL HYPERTENSION: Status: ACTIVE | Noted: 2025-02-14

## 2025-02-14 PROBLEM — K59.00 CONSTIPATION: Status: ACTIVE | Noted: 2025-02-14

## 2025-02-14 PROBLEM — M16.12 OSTEOARTHRITIS OF LEFT HIP: Status: ACTIVE | Noted: 2025-02-14

## 2025-02-14 PROBLEM — E78.6 LOW HDL (UNDER 40): Status: ACTIVE | Noted: 2025-02-14

## 2025-02-14 PROBLEM — D50.9 IRON DEFICIENCY ANEMIA: Status: ACTIVE | Noted: 2025-02-14

## 2025-02-14 PROBLEM — R73.01 IFG (IMPAIRED FASTING GLUCOSE): Status: ACTIVE | Noted: 2025-02-14

## 2025-02-14 PROBLEM — I47.10 PSVT (PAROXYSMAL SUPRAVENTRICULAR TACHYCARDIA): Status: ACTIVE | Noted: 2025-02-14

## 2025-02-14 LAB
ALBUMIN SERPL-MCNC: 4.4 G/DL (ref 3.5–5.2)
ALBUMIN/GLOB SERPL: 1.5 {RATIO} (ref 1–2.5)
ALP SERPL-CCNC: 117 U/L (ref 40–129)
ALT SERPL-CCNC: 23 U/L (ref 5–41)
ANION GAP SERPL CALCULATED.3IONS-SCNC: 11 MMOL/L (ref 9–17)
AST SERPL-CCNC: 25 U/L
BASOPHILS # BLD: 0.05 K/UL (ref 0–0.2)
BASOPHILS NFR BLD: 1 % (ref 0–2)
BILIRUB SERPL-MCNC: 0.5 MG/DL (ref 0.3–1.2)
BUN SERPL-MCNC: 13 MG/DL (ref 8–23)
BUN/CREAT SERPL: 13 (ref 9–20)
CALCIUM SERPL-MCNC: 9.3 MG/DL (ref 8.6–10.4)
CHLORIDE SERPL-SCNC: 100 MMOL/L (ref 98–107)
CO2 SERPL-SCNC: 25 MMOL/L (ref 20–31)
CREAT SERPL-MCNC: 1 MG/DL (ref 0.7–1.2)
D DIMER PPP FEU-MCNC: 0.44 UG/ML FEU (ref 0–0.59)
EKG ATRIAL RATE: 83 BPM
EKG ATRIAL RATE: 84 BPM
EKG P AXIS: 36 DEGREES
EKG P AXIS: 45 DEGREES
EKG P-R INTERVAL: 158 MS
EKG P-R INTERVAL: 158 MS
EKG Q-T INTERVAL: 406 MS
EKG Q-T INTERVAL: 420 MS
EKG QRS DURATION: 152 MS
EKG QRS DURATION: 152 MS
EKG QTC CALCULATION (BAZETT): 479 MS
EKG QTC CALCULATION (BAZETT): 493 MS
EKG R AXIS: -39 DEGREES
EKG R AXIS: -42 DEGREES
EKG T AXIS: 27 DEGREES
EKG T AXIS: 29 DEGREES
EKG VENTRICULAR RATE: 83 BPM
EKG VENTRICULAR RATE: 84 BPM
EOSINOPHIL # BLD: 0.08 K/UL (ref 0–0.44)
EOSINOPHILS RELATIVE PERCENT: 1 % (ref 1–4)
ERYTHROCYTE [DISTWIDTH] IN BLOOD BY AUTOMATED COUNT: 13.7 % (ref 11.8–14.4)
GFR, ESTIMATED: 84 ML/MIN/1.73M2
GLUCOSE SERPL-MCNC: 153 MG/DL (ref 70–99)
HCT VFR BLD AUTO: 38.1 % (ref 40.7–50.3)
HGB BLD-MCNC: 12.4 G/DL (ref 13–17)
IMM GRANULOCYTES # BLD AUTO: 0.03 K/UL (ref 0–0.3)
IMM GRANULOCYTES NFR BLD: 0 %
INFLUENZA A ANTIGEN, POC: NEGATIVE
INFLUENZA B ANTIGEN, POC: NEGATIVE
INR PPP: 1.1
LACTATE BLDV-SCNC: 1.5 MMOL/L (ref 0.5–2.2)
LACTATE BLDV-SCNC: 2.3 MMOL/L (ref 0.5–1.9)
LACTATE BLDV-SCNC: 2.7 MMOL/L (ref 0.5–1.9)
LOT EXPIRE DATE: NORMAL
LOT KIT NUMBER: NORMAL
LYMPHOCYTES NFR BLD: 0.72 K/UL (ref 1.1–3.7)
LYMPHOCYTES RELATIVE PERCENT: 10 % (ref 24–43)
MCH RBC QN AUTO: 26.4 PG (ref 25.2–33.5)
MCHC RBC AUTO-ENTMCNC: 32.5 G/DL (ref 25.2–33.5)
MCV RBC AUTO: 81.1 FL (ref 82.6–102.9)
MONOCYTES NFR BLD: 1.11 K/UL (ref 0.1–1.2)
MONOCYTES NFR BLD: 15 % (ref 3–12)
NEUTROPHILS NFR BLD: 73 % (ref 36–65)
NEUTS SEG NFR BLD: 5.33 K/UL (ref 1.5–8.1)
NRBC BLD-RTO: 0 PER 100 WBC
PLATELET # BLD AUTO: 265 K/UL (ref 138–453)
PMV BLD AUTO: 10.4 FL (ref 8.1–13.5)
POTASSIUM SERPL-SCNC: 3.9 MMOL/L (ref 3.7–5.3)
PROT SERPL-MCNC: 7.3 G/DL (ref 6.4–8.3)
PROTHROMBIN TIME: 13.7 SEC (ref 11.5–14.2)
RBC # BLD AUTO: 4.7 M/UL (ref 4.21–5.77)
RBC # BLD: ABNORMAL 10*6/UL
SARS-COV-2, POC: NORMAL
SODIUM SERPL-SCNC: 136 MMOL/L (ref 135–144)
TROPONIN I SERPL HS-MCNC: 10 NG/L (ref 0–22)
TROPONIN I SERPL HS-MCNC: 12 NG/L (ref 0–22)
VALID INTERNAL CONTROL: NORMAL
VENDOR AND KIT NAME POC: NORMAL
WBC OTHER # BLD: 7.3 K/UL (ref 3.5–11.3)

## 2025-02-14 PROCEDURE — 36415 COLL VENOUS BLD VENIPUNCTURE: CPT

## 2025-02-14 PROCEDURE — 6360000002 HC RX W HCPCS

## 2025-02-14 PROCEDURE — 6370000000 HC RX 637 (ALT 250 FOR IP)

## 2025-02-14 PROCEDURE — 85025 COMPLETE CBC W/AUTO DIFF WBC: CPT

## 2025-02-14 PROCEDURE — 2500000003 HC RX 250 WO HCPCS

## 2025-02-14 PROCEDURE — 87040 BLOOD CULTURE FOR BACTERIA: CPT

## 2025-02-14 PROCEDURE — 96360 HYDRATION IV INFUSION INIT: CPT

## 2025-02-14 PROCEDURE — 6370000000 HC RX 637 (ALT 250 FOR IP): Performed by: INTERNAL MEDICINE

## 2025-02-14 PROCEDURE — 99285 EMERGENCY DEPT VISIT HI MDM: CPT

## 2025-02-14 PROCEDURE — 87428 SARSCOV & INF VIR A&B AG IA: CPT | Performed by: NURSE PRACTITIONER

## 2025-02-14 PROCEDURE — 99222 1ST HOSP IP/OBS MODERATE 55: CPT | Performed by: INTERNAL MEDICINE

## 2025-02-14 PROCEDURE — 6360000002 HC RX W HCPCS: Performed by: EMERGENCY MEDICINE

## 2025-02-14 PROCEDURE — 94640 AIRWAY INHALATION TREATMENT: CPT

## 2025-02-14 PROCEDURE — 2060000000 HC ICU INTERMEDIATE R&B

## 2025-02-14 PROCEDURE — 84484 ASSAY OF TROPONIN QUANT: CPT

## 2025-02-14 PROCEDURE — 80053 COMPREHEN METABOLIC PANEL: CPT

## 2025-02-14 PROCEDURE — 93005 ELECTROCARDIOGRAM TRACING: CPT | Performed by: EMERGENCY MEDICINE

## 2025-02-14 PROCEDURE — 71045 X-RAY EXAM CHEST 1 VIEW: CPT

## 2025-02-14 PROCEDURE — 2580000003 HC RX 258: Performed by: EMERGENCY MEDICINE

## 2025-02-14 PROCEDURE — 85610 PROTHROMBIN TIME: CPT

## 2025-02-14 PROCEDURE — 83605 ASSAY OF LACTIC ACID: CPT

## 2025-02-14 PROCEDURE — 2580000003 HC RX 258

## 2025-02-14 PROCEDURE — 85379 FIBRIN DEGRADATION QUANT: CPT

## 2025-02-14 RX ORDER — 0.9 % SODIUM CHLORIDE 0.9 %
1000 INTRAVENOUS SOLUTION INTRAVENOUS ONCE
Status: DISCONTINUED | OUTPATIENT
Start: 2025-02-14 | End: 2025-02-14

## 2025-02-14 RX ORDER — IPRATROPIUM BROMIDE AND ALBUTEROL SULFATE 2.5; .5 MG/3ML; MG/3ML
1 SOLUTION RESPIRATORY (INHALATION)
Status: DISCONTINUED | OUTPATIENT
Start: 2025-02-15 | End: 2025-02-15 | Stop reason: HOSPADM

## 2025-02-14 RX ORDER — IPRATROPIUM BROMIDE AND ALBUTEROL SULFATE 2.5; .5 MG/3ML; MG/3ML
1 SOLUTION RESPIRATORY (INHALATION)
Status: DISCONTINUED | OUTPATIENT
Start: 2025-02-14 | End: 2025-02-14

## 2025-02-14 RX ORDER — METOPROLOL SUCCINATE 25 MG/1
25 TABLET, EXTENDED RELEASE ORAL ONCE
Status: COMPLETED | OUTPATIENT
Start: 2025-02-14 | End: 2025-02-14

## 2025-02-14 RX ORDER — LACTULOSE 10 G/15ML
20 SOLUTION ORAL 3 TIMES DAILY
Status: DISCONTINUED | OUTPATIENT
Start: 2025-02-14 | End: 2025-02-14

## 2025-02-14 RX ORDER — METOPROLOL SUCCINATE 25 MG/1
25 TABLET, EXTENDED RELEASE ORAL DAILY
Status: DISCONTINUED | OUTPATIENT
Start: 2025-02-15 | End: 2025-02-14

## 2025-02-14 RX ORDER — SODIUM CHLORIDE 0.9 % (FLUSH) 0.9 %
5-40 SYRINGE (ML) INJECTION PRN
Status: DISCONTINUED | OUTPATIENT
Start: 2025-02-14 | End: 2025-02-15 | Stop reason: HOSPADM

## 2025-02-14 RX ORDER — HYDRALAZINE HYDROCHLORIDE 20 MG/ML
10 INJECTION INTRAMUSCULAR; INTRAVENOUS EVERY 6 HOURS PRN
Status: DISCONTINUED | OUTPATIENT
Start: 2025-02-14 | End: 2025-02-15 | Stop reason: HOSPADM

## 2025-02-14 RX ORDER — SODIUM CHLORIDE FOR INHALATION 0.9 %
3 VIAL, NEBULIZER (ML) INHALATION
Status: DISCONTINUED | OUTPATIENT
Start: 2025-02-14 | End: 2025-02-14

## 2025-02-14 RX ORDER — POLYETHYLENE GLYCOL 3350 17 G/17G
17 POWDER, FOR SOLUTION ORAL DAILY
COMMUNITY

## 2025-02-14 RX ORDER — POLYETHYLENE GLYCOL 3350 17 G/17G
17 POWDER, FOR SOLUTION ORAL DAILY PRN
Status: DISCONTINUED | OUTPATIENT
Start: 2025-02-14 | End: 2025-02-15 | Stop reason: HOSPADM

## 2025-02-14 RX ORDER — AZITHROMYCIN 250 MG/1
500 TABLET, FILM COATED ORAL EVERY 24 HOURS
Status: DISCONTINUED | OUTPATIENT
Start: 2025-02-14 | End: 2025-02-14 | Stop reason: CLARIF

## 2025-02-14 RX ORDER — ALBUTEROL SULFATE 0.83 MG/ML
2.5 SOLUTION RESPIRATORY (INHALATION)
Status: DISCONTINUED | OUTPATIENT
Start: 2025-02-14 | End: 2025-02-15 | Stop reason: HOSPADM

## 2025-02-14 RX ORDER — ENOXAPARIN SODIUM 100 MG/ML
40 INJECTION SUBCUTANEOUS DAILY
Status: DISCONTINUED | OUTPATIENT
Start: 2025-02-14 | End: 2025-02-15 | Stop reason: HOSPADM

## 2025-02-14 RX ORDER — SODIUM CHLORIDE 0.9 % (FLUSH) 0.9 %
5-40 SYRINGE (ML) INJECTION EVERY 12 HOURS SCHEDULED
Status: DISCONTINUED | OUTPATIENT
Start: 2025-02-14 | End: 2025-02-15 | Stop reason: HOSPADM

## 2025-02-14 RX ORDER — ONDANSETRON 4 MG/1
4 TABLET, ORALLY DISINTEGRATING ORAL EVERY 8 HOURS PRN
Status: DISCONTINUED | OUTPATIENT
Start: 2025-02-14 | End: 2025-02-15 | Stop reason: HOSPADM

## 2025-02-14 RX ORDER — ONDANSETRON 2 MG/ML
4 INJECTION INTRAMUSCULAR; INTRAVENOUS EVERY 6 HOURS PRN
Status: DISCONTINUED | OUTPATIENT
Start: 2025-02-14 | End: 2025-02-15 | Stop reason: HOSPADM

## 2025-02-14 RX ORDER — METHYLPREDNISOLONE SODIUM SUCCINATE 40 MG/ML
40 INJECTION INTRAMUSCULAR; INTRAVENOUS EVERY 8 HOURS
Status: DISCONTINUED | OUTPATIENT
Start: 2025-02-14 | End: 2025-02-15

## 2025-02-14 RX ORDER — LISINOPRIL 5 MG/1
10 TABLET ORAL DAILY
Status: DISCONTINUED | OUTPATIENT
Start: 2025-02-14 | End: 2025-02-15

## 2025-02-14 RX ORDER — 0.9 % SODIUM CHLORIDE 0.9 %
1000 INTRAVENOUS SOLUTION INTRAVENOUS ONCE
Status: COMPLETED | OUTPATIENT
Start: 2025-02-14 | End: 2025-02-14

## 2025-02-14 RX ORDER — SODIUM CHLORIDE 9 MG/ML
INJECTION, SOLUTION INTRAVENOUS PRN
Status: DISCONTINUED | OUTPATIENT
Start: 2025-02-14 | End: 2025-02-15 | Stop reason: HOSPADM

## 2025-02-14 RX ORDER — METOPROLOL SUCCINATE 50 MG/1
50 TABLET, EXTENDED RELEASE ORAL DAILY
Status: DISCONTINUED | OUTPATIENT
Start: 2025-02-15 | End: 2025-02-15 | Stop reason: HOSPADM

## 2025-02-14 RX ORDER — GUAIFENESIN/DEXTROMETHORPHAN 100-10MG/5
5 SYRUP ORAL EVERY 4 HOURS PRN
Status: DISCONTINUED | OUTPATIENT
Start: 2025-02-14 | End: 2025-02-15 | Stop reason: HOSPADM

## 2025-02-14 RX ORDER — SODIUM CHLORIDE 9 MG/ML
INJECTION, SOLUTION INTRAVENOUS CONTINUOUS
Status: ACTIVE | OUTPATIENT
Start: 2025-02-14 | End: 2025-02-15

## 2025-02-14 RX ORDER — OXYMETAZOLINE HYDROCHLORIDE 0.05 G/100ML
1 SPRAY NASAL 2 TIMES DAILY PRN
Status: DISCONTINUED | OUTPATIENT
Start: 2025-02-14 | End: 2025-02-15 | Stop reason: HOSPADM

## 2025-02-14 RX ORDER — AZITHROMYCIN 250 MG/1
500 TABLET, FILM COATED ORAL DAILY
Status: DISCONTINUED | OUTPATIENT
Start: 2025-02-14 | End: 2025-02-15 | Stop reason: HOSPADM

## 2025-02-14 RX ORDER — ACETAMINOPHEN 325 MG/1
650 TABLET ORAL EVERY 6 HOURS PRN
Status: DISCONTINUED | OUTPATIENT
Start: 2025-02-14 | End: 2025-02-15 | Stop reason: HOSPADM

## 2025-02-14 RX ORDER — ACETAMINOPHEN 650 MG/1
650 SUPPOSITORY RECTAL EVERY 6 HOURS PRN
Status: DISCONTINUED | OUTPATIENT
Start: 2025-02-14 | End: 2025-02-15 | Stop reason: HOSPADM

## 2025-02-14 RX ORDER — LACTULOSE 10 G/15ML
20 SOLUTION ORAL 2 TIMES DAILY PRN
Status: DISCONTINUED | OUTPATIENT
Start: 2025-02-14 | End: 2025-02-15 | Stop reason: HOSPADM

## 2025-02-14 RX ADMIN — SODIUM CHLORIDE, PRESERVATIVE FREE 10 ML: 5 INJECTION INTRAVENOUS at 15:44

## 2025-02-14 RX ADMIN — ACETAMINOPHEN 650 MG: 325 TABLET ORAL at 19:46

## 2025-02-14 RX ADMIN — LACTULOSE 20 G: 10 SOLUTION ORAL at 15:42

## 2025-02-14 RX ADMIN — SODIUM CHLORIDE 1000 ML: 0.9 INJECTION, SOLUTION INTRAVENOUS at 15:14

## 2025-02-14 RX ADMIN — ENOXAPARIN SODIUM 40 MG: 100 INJECTION SUBCUTANEOUS at 15:15

## 2025-02-14 RX ADMIN — AZITHROMYCIN MONOHYDRATE 500 MG: 500 INJECTION, POWDER, LYOPHILIZED, FOR SOLUTION INTRAVENOUS at 12:54

## 2025-02-14 RX ADMIN — SODIUM CHLORIDE: 0.9 INJECTION, SOLUTION INTRAVENOUS at 17:24

## 2025-02-14 RX ADMIN — METOPROLOL SUCCINATE 25 MG: 25 TABLET, EXTENDED RELEASE ORAL at 17:03

## 2025-02-14 RX ADMIN — GUAIFENESIN AND DEXTROMETHORPHAN 5 ML: 100; 10 SYRUP ORAL at 15:15

## 2025-02-14 RX ADMIN — METHYLPREDNISOLONE SODIUM SUCCINATE 40 MG: 40 INJECTION INTRAMUSCULAR; INTRAVENOUS at 22:10

## 2025-02-14 RX ADMIN — ACETAMINOPHEN 650 MG: 325 TABLET ORAL at 13:27

## 2025-02-14 RX ADMIN — CEFTRIAXONE 1000 MG: 1 INJECTION, POWDER, FOR SOLUTION INTRAMUSCULAR; INTRAVENOUS at 12:20

## 2025-02-14 RX ADMIN — METHYLPREDNISOLONE SODIUM SUCCINATE 40 MG: 40 INJECTION INTRAMUSCULAR; INTRAVENOUS at 15:44

## 2025-02-14 RX ADMIN — IPRATROPIUM BROMIDE AND ALBUTEROL SULFATE 1 DOSE: .5; 2.5 SOLUTION RESPIRATORY (INHALATION) at 16:30

## 2025-02-14 RX ADMIN — SODIUM CHLORIDE 1000 ML: 0.9 INJECTION, SOLUTION INTRAVENOUS at 10:40

## 2025-02-14 SDOH — ECONOMIC STABILITY: FOOD INSECURITY: WITHIN THE PAST 12 MONTHS, YOU WORRIED THAT YOUR FOOD WOULD RUN OUT BEFORE YOU GOT MONEY TO BUY MORE.: NEVER TRUE

## 2025-02-14 SDOH — ECONOMIC STABILITY: FOOD INSECURITY: WITHIN THE PAST 12 MONTHS, THE FOOD YOU BOUGHT JUST DIDN'T LAST AND YOU DIDN'T HAVE MONEY TO GET MORE.: NEVER TRUE

## 2025-02-14 ASSESSMENT — PATIENT HEALTH QUESTIONNAIRE - PHQ9
1. LITTLE INTEREST OR PLEASURE IN DOING THINGS: NOT AT ALL
SUM OF ALL RESPONSES TO PHQ QUESTIONS 1-9: 0
2. FEELING DOWN, DEPRESSED OR HOPELESS: NOT AT ALL
SUM OF ALL RESPONSES TO PHQ9 QUESTIONS 1 & 2: 0
SUM OF ALL RESPONSES TO PHQ QUESTIONS 1-9: 0

## 2025-02-14 ASSESSMENT — ENCOUNTER SYMPTOMS
DIARRHEA: 0
ABDOMINAL PAIN: 0
SHORTNESS OF BREATH: 0
BLOOD IN STOOL: 0
CONSTIPATION: 0
VOMITING: 0
NAUSEA: 1
NAUSEA: 1
WHEEZING: 0
SORE THROAT: 0
TROUBLE SWALLOWING: 0
BACK PAIN: 0

## 2025-02-14 ASSESSMENT — PAIN DESCRIPTION - LOCATION
LOCATION: HEAD
LOCATION: HEAD

## 2025-02-14 ASSESSMENT — PAIN SCALES - GENERAL
PAINLEVEL_OUTOF10: 3
PAINLEVEL_OUTOF10: 3

## 2025-02-14 ASSESSMENT — PAIN - FUNCTIONAL ASSESSMENT
PAIN_FUNCTIONAL_ASSESSMENT: NONE - DENIES PAIN
PAIN_FUNCTIONAL_ASSESSMENT: ACTIVITIES ARE NOT PREVENTED
PAIN_FUNCTIONAL_ASSESSMENT: ACTIVITIES ARE NOT PREVENTED

## 2025-02-14 ASSESSMENT — PAIN DESCRIPTION - DESCRIPTORS
DESCRIPTORS: ACHING
DESCRIPTORS: ACHING

## 2025-02-14 ASSESSMENT — PAIN DESCRIPTION - ORIENTATION: ORIENTATION: POSTERIOR;LOWER

## 2025-02-14 ASSESSMENT — LIFESTYLE VARIABLES
HOW MANY STANDARD DRINKS CONTAINING ALCOHOL DO YOU HAVE ON A TYPICAL DAY: PATIENT DOES NOT DRINK
HOW OFTEN DO YOU HAVE A DRINK CONTAINING ALCOHOL: NEVER

## 2025-02-14 NOTE — PROGRESS NOTES
Physical Therapy  PT on hold this afternoon per nursing and hospitalist due to patient being newly admitted to the floor after already being to UrgentCare and the ER prior, including a loss of consciousness episode. Will attempt PT eval tomorrow after patient has had additional time to adjust and receive fluids, etc.    Sukumar Grady, PT, DPT

## 2025-02-14 NOTE — ED NOTES
ED Report    Presents to ED from: Home    Chief Complaint   Patient presents with    Loss of Consciousness    Nausea     1. Pneumonia of both lungs due to infectious organism, unspecified part of lung    2. Syncope and collapse      Present Condition: stable  Pertinent Event(s): pt with syncopal episode in urgent care. [facility] pacemaker was interrogated and no correlating events to the syncope. C/o nausea and weakness for a couple days. Coughing up thick yellow sputum.     LOC:  A+O x 4  Code Status: FULL    Vital Signs   Vitals:    02/14/25 1030 02/14/25 1100 02/14/25 1130 02/14/25 1200   BP: (!) 160/75 (!) 183/90 (!) 177/83 (!) 189/88   Pulse: 85 84 86 81   Resp: 21 26 24 23   Temp:       TempSrc:       SpO2: 94% 97% 96% 97%   Height:         Oxygen Baseline: Room Air       Current Oxygen Needs: Room Air  Mobility: Independent       Mobility Aide: Independent    LDAs:   Peripheral IV 02/14/25 Right Antecubital (Active)   Site Assessment Clean, dry & intact 02/14/25 0945   Line Status Blood return noted;Normal saline locked;Specimen collected;Flushed 02/14/25 0945   Phlebitis Assessment No symptoms 02/14/25 0945   Infiltration Assessment 0 02/14/25 0945   Dressing Status New dressing applied 02/14/25 0945   Dressing Type Transparent 02/14/25 0945   Dressing Intervention New 02/14/25 0945     Abnormal ED Labs:    Labs Reviewed   CBC WITH AUTO DIFFERENTIAL - Abnormal; Notable for the following components:       Result Value    Hemoglobin 12.4 (*)     Hematocrit 38.1 (*)     MCV 81.1 (*)     Neutrophils % 73 (*)     Lymphocytes % 10 (*)     Monocytes % 15 (*)     Lymphocytes Absolute 0.72 (*)     All other components within normal limits   COMPREHENSIVE METABOLIC PANEL W/ REFLEX TO MG FOR LOW K - Abnormal; Notable for the following components:    Glucose 153 (*)     All other components within normal limits   LACTATE, SEPSIS - Abnormal; Notable for the following components:    Lactic Acid, Sepsis 2.7 (*)     All other

## 2025-02-14 NOTE — H&P
HOSPITALIST ADMISSION H&P      REASON FOR ADMISSION:  vasovagal syncope , cap   ESTIMATED LENGTH OF STAY:3 nights     ATTENDING/ADMITTING PHYSICIAN: Celsa Max DO MD  PCP: Vale Obrien DO    HISTORY OF PRESENT ILLNESS:      The patient is a 64 y.o. male patient of Vale Obrien DO who presents with cough with sputum production, myalgia, nausea, decreased p.o. intake that started yesterday.  Today patient went to outpatient clinic, when they were doing the nasal swab, patient passed out and lost consciousness for less than 1 minute.  Patient states that this only happened prior to getting his pacemaker placed.  Patient does have pacemaker in place, secondary to his sick sinus syndrome.  Patient also states that for the past, he felt extremely hot, PCP placed with towel on his back, patient passed out right after this.  Patient presented in the ED, vitals significant for slightly elevated blood pressure 164/81, otherwise normal and on room air.  Chest x-ray showed scattered pulmonary infiltrates.  Lactic was significant for 2.7, followed by 2.3.  Influenza and COVID swabs are negative.     Patient will be admitted to the hospital for the management of pneumonia.    See below for additional PMH.    Patient ajdx-[id number]-bshlaems-available records reviewed, including, but not limited to,       Past Medical History:   Diagnosis Date    Allergic rhinitis     Fainting     Granuloma, skin     elbow  1975    Hypertension 2018    Measles     Mumps     Sick sinus syndrome (HCC)            Past Surgical History:   Procedure Laterality Date    COLONOSCOPY  02/08/2011    COLONOSCOPY N/A 2/23/2022    Colonoscopy performed by Malik Benitez MD at [facility] OR    PACEMAKER PLACEMENT  8/4/2006    PACEMAKER PLACEMENT  9/15/2014    battery replaced    TOENAIL EXCISION Left 09/12/78     great toenail       Medications Prior to Admission:    Not in a hospital admission.    Allergies:    Patient has no known

## 2025-02-14 NOTE — PROGRESS NOTES
Subjective:      Patient ID: Hugh Pandya is a 64 y.o. male coming in for   Chief Complaint   Patient presents with    Cold Symptoms     Pt ill since yesterday with cough, shortness of breath, weakness, nausea, lightheadedness, body aches. LBM 2 days ago.        History of Present Illness  The patient is a 64-year-old male who presents to the office with flu-like symptoms.    He began experiencing symptoms abruptly yesterday evening, characterized by nausea, lightheadedness, and generalized body aches. He reports mild abdominal discomfort but no chest pain. He has not experienced any episodes of vomiting or diarrhea. His urinary output has been minimal, approximately a tablespoon in volume. He also reports a sensation of escalating body temperature.    Review of Systems   Constitutional:  Positive for chills and diaphoresis.   Gastrointestinal:  Positive for nausea.   Musculoskeletal:  Positive for myalgias.   Neurological:  Positive for light-headedness.        Objective:BP (!) 166/92   Pulse 94   Temp 98.3 °F (36.8 °C) (Tympanic)   Resp 20   Ht 1.803 m (5' 11\")   Wt 112 kg (247 lb)   SpO2 97%   BMI 34.45 kg/m²      Physical Exam  Constitutional:       Appearance: He is ill-appearing and diaphoretic.   HENT:      Head: Normocephalic.   Skin:     Coloration: Skin is pale.          Assessment:      1. Body aches    2. Vasovagal syncope    3. Lightheadedness       Results        Plan:     Assessment & Plan  1. Influenza-like illness.  He reports symptoms of nausea, lightheadedness, and body aches that started yesterday evening. There is no vomiting or diarrhea, but he has some abdominal pain and minimal urination. During the visit, he experienced a syncopal episode. Given the severity of his symptoms and the recent syncopal episode, he will be transferred to the emergency room for further evaluation and management.    During exam/visit pt developed nausea, diaphoresis. Started emesis then had syncopal episode

## 2025-02-14 NOTE — ED PROVIDER NOTES
[facility]  eMERGENCY dEPARTMENT eNCOUnter      Pt Name: Hugh Pandya  MRN: [id number]  Birthdate 1960  Date of evaluation: 2/14/2025      CHIEF COMPLAINT       Chief Complaint   Patient presents with    Loss of Consciousness    Nausea         HISTORY OF PRESENT ILLNESS    Hugh Pandya is a 64 y.o. male who presents with chief complaint of loss of consciousness patient said last night he started getting feeling fatigued sweating he has had a cough that hasgotten worse he says the phlegm seems to hang up I made an appoint to be see his physician he went over and in the office had a syncopal event he does have a pacemaker in place he denies any chest pain he said last night he was so so hot he had to sleep with a fan on him and set up there is been no recent travel or immobilization no leg swelling      REVIEW OF SYSTEMS         Review of Systems   Constitutional:  Positive for chills, fatigue and fever.   HENT:  Negative for congestion, dental problem, sore throat and trouble swallowing.    Eyes:  Negative for visual disturbance.   Respiratory:  Negative for shortness of breath and wheezing.    Cardiovascular:  Negative for chest pain, palpitations and leg swelling.   Gastrointestinal:  Positive for nausea. Negative for abdominal pain, blood in stool, constipation, diarrhea and vomiting.   Genitourinary:  Negative for difficulty urinating, dysuria and testicular pain.   Musculoskeletal:  Negative for back pain, joint swelling and neck pain.   Skin:  Negative for rash.   Neurological:  Positive for syncope. Negative for dizziness, weakness and headaches.   Hematological:  Negative for adenopathy. Does not bruise/bleed easily.   Psychiatric/Behavioral:  Negative for confusion and suicidal ideas.          PAST MEDICAL HISTORY    has a past medical history of Allergic rhinitis, Fainting, Granuloma, skin, Hypertension, Measles, Mumps, and Sick sinus syndrome (HCC).    SURGICAL HISTORY      has a past

## 2025-02-14 NOTE — PROGRESS NOTES
[]  Above 69%  []  Unable []  Above 60-69%  []  Unable []  Above 50-59%  [x]  Unable []  Above 35-49%  []  Unable []  Less than 35%  []  Unable                 []  Hyperinflation Assessment  Score 1 2 3   CXR and Breath Sounds   []  Clear [x]  No atelectasis  Basilar aeration []  Atelectasis or absent basilar breath sounds   Incentive Spirometry Volume  (Per IBW)   [x]  Greater than or equal to 15ml/Kg []  less than 15ml/Kg []  less than 15ml/Kg   Surgery within last 2 weeks [x]  None or general   []  Abdominal or thoracic surgery  []  Abdominal or thoracic   Chronic Pulmonary Historyre [x]  No []  Yes []  Yes     []  Secretion Management Assessment  Score 1 2 3   Bilateral Breath Sounds   [x]  Occasional Rhonchi []  Scattered Rhonchi []  Course Rhonchi and/or poor aeration   Sputum    [x]  Small amount of thin secretions []  Moderate amount of viscous secretions []  Copius, Viscious Yellow/ Secretions   CXR as reported by physician []  clear  []  Unavailable [x]  Infiltrates and/or consolidation  []  Unavailable []  Mucus Plugging and or lobar consolidation  []  Unavailable   Cough [x]  Strong, productive cough []  Weak productive cough []  No cough or weak non-productive cough   Tasha Martel, RCP  4:57 PM                            FEMALE                                  MALE                            FEV1 Predicted Normal Values                        FEV1 Predicted Normal Values          Age                                     Height in Feet and Inches       Age                                     Height in Feet and Inches       4' 11\" 5' 1\" 5' 3\" 5' 5\" 5' 7\" 5' 9\" 5' 11\" 6' 1\"  4' 11\" 5' 1\" 5' 3\" 5' 5\" 5' 7\" 5' 9\" 5' 11\" 6' 1\"   42 - 45 2.49 2.66 2.84 3.03 3.22 3.42 3.62 3.83 42 - 45 2.82 3.03 3.26 3.49 3.72 3.96 4.22 4.47   46 - 49 2.40 2.57 2.76 2.94 3.14 3.33 3.54 3.75 46 - 49 2.70 2.92 3.14 3.37 3.61 3.85 4.10 4.36   50 - 53 2.31 2.48 2.66 2.85 3.04 3.24 3.45 3.66 50 - 53 2.58 2.80 3.02 3.25 3.49 3.73  3.98 4.24   54 - 57 2.21 2.38 2.57 2.75 2.95 3.14 3.35 3.56 54 - 57 2.46 2.67 2.89 3.12 3.36 3.60 3.85 4.11   58 - 61 2.10 2.28 2.46 2.65 2.84 3.04 3.24 3.45 58 - 61 2.32 2.54 2.76 2.99 3.23 3.47 3.72 3.98   62 - 65 1.99 2.17 2.35 2.54 2.73 2.93 3.13 3.34 62 - 65 2.19 2.40 2.62 2.85 3.09 3.33 3.58 3.84   66 - 69 1.88 2.05 2.23 2.42 2.61 2.81 3.02 3.23 66 - 69 2.04 2.26 2.48 2.71 2.95 3.19 3.44 3.70   70+ 1.82 1.99 2.17 2.36 2.55 2.75 2.95 3.16 70+ 1.97 2.19 2.41 2.64 2.87 3.12 3.37 3.62             Predicted Peak Expiratory Flow Rate                                       Height (in)  Female       Height (in) Male           Age 56 58 60 62 64 66 68 70 Age            20 344 357 372 387 402 417 432 446  60 62 64 66 68 70 72 74 76   25 337 352 366 381 396 411 426 441 25 447 476 505 533 562 591 619 648 677   30 329 344 359 374 389 404 419 434 30 437 466 494 523 552 580 609 638 667   35 322 337 351 366 381 396 411 426 35 426 455 484 512 541 570 598 627 657   40 314 329 344 359 374 389 404 419 40 416 445 473 502 531 559 588 617 647   45 307 322 336 351 366 381 396 411 45 405 434 463 491 520 549 577 606 636   50 299 314 329 344 359 374 389 404 50 395 424 452 481 510 538 567 596 625   55 292 307 321 336 351 366 381 396 55 384 413 442 470 499 528 556 585 615   60 284 299 314 329 344 359 374 389 60 374 403 431 460 489 517 546 575 605   65 277 292 306 321 336 351 366 381 65 363 392 421 449 478 507 535 564 594   70 269 284 299 314 329 344 359 374 70 353 382 410 439 468 496 525 554 583   75 261 274 289 305 319 334 348 364 75 344 372 400 429 458 487 515 544 573   80 253 266 282 296 312 327 342 356 80 335 362 390 419 448 476 [phone]

## 2025-02-14 NOTE — PLAN OF CARE
Problem: Discharge Planning  Goal: Discharge to home or other facility with appropriate resources  Outcome: Progressing  Flowsheets (Taken 2/14/2025 5647)  Discharge to home or other facility with appropriate resources:   Identify barriers to discharge with patient and caregiver   Arrange for needed discharge resources and transportation as appropriate   Identify discharge learning needs (meds, wound care, etc)   Refer to discharge planning if patient needs post-hospital services based on physician order or complex needs related to functional status, cognitive ability or social support system     Problem: Respiratory - Adult  Goal: Achieves optimal ventilation and oxygenation  Outcome: Progressing     Problem: Musculoskeletal - Adult  Goal: Return mobility to safest level of function  Outcome: Progressing  Goal: Return ADL status to a safe level of function  Outcome: Progressing     Problem: Safety - Adult  Goal: Free from fall injury  Outcome: Progressing

## 2025-02-14 NOTE — PROGRESS NOTES
Spiritual Health History and Assessment/Progress Note  [facility]    (P) Spiritual/Emotional Needs,  ,  ,      Name: Hugh Pandya MRN: [id number]    Age: 64 y.o.     Sex: male   Language: English   Denominational: None   Community acquired pneumonia of both lower lobes     Date: 2/14/2025            Total Time Calculated: (P) 50 min              Spiritual Assessment began in [facility]  PROGRESSIVE CARE        Referral/Consult From: (P) Rounding   Encounter Overview/Reason: (P) Spiritual/Emotional Needs  Service Provided For: (P) Patient    Patient is a recent retiree and is the main caregiver for his wife (suffering with PTSD).  Patient is the support person for his extended family and is not currently active in a Gnosticist due to complicated situations in the past.  Patient is dedicated to daily Bible reading, from where he draws his spiritual strength.  Patient was engaging in theological discussion and shared about how God has been active throughout his life.    Maria Dolores, Belief, Meaning:   Patient identifies as spiritual, is connected with a maria dolores tradition or spiritual practice, and has beliefs or practices that help with coping during difficult times  Family/Friends No family/friends present      Importance and Influence:  Patient has spiritual/personal beliefs that influence decisions regarding their health  Family/Friends No family/friends present    Community:  Patient feels well-supported. Support system includes: Spouse/Partner, Children, Friends, and Extended family  Family/Friends No family/friends present    Assessment and Plan of Care:     Patient Interventions include: Facilitated expression of thoughts and feelings, Engaged in theological reflection, Affirmed coping skills/support systems, and Provided sacramental/Congregational ritual  Family/Friends Interventions include: No family/friends present    Patient Plan of Care: Spiritual Care available upon further referral  Family/Friends Plan of Care: No

## 2025-02-15 VITALS
HEIGHT: 71 IN | HEART RATE: 85 BPM | WEIGHT: 243.2 LBS | OXYGEN SATURATION: 96 % | RESPIRATION RATE: 17 BRPM | SYSTOLIC BLOOD PRESSURE: 148 MMHG | BODY MASS INDEX: 34.05 KG/M2 | TEMPERATURE: 98.6 F | DIASTOLIC BLOOD PRESSURE: 85 MMHG

## 2025-02-15 LAB
ALBUMIN SERPL-MCNC: 4.6 G/DL (ref 3.5–5.2)
ALBUMIN/GLOB SERPL: 1.5 {RATIO} (ref 1–2.5)
ALP SERPL-CCNC: 115 U/L (ref 40–129)
ALT SERPL-CCNC: 28 U/L (ref 5–41)
ANION GAP SERPL CALCULATED.3IONS-SCNC: 15 MMOL/L (ref 9–17)
AST SERPL-CCNC: 39 U/L
BASOPHILS # BLD: <0.03 K/UL (ref 0–0.2)
BASOPHILS NFR BLD: 0 % (ref 0–2)
BILIRUB SERPL-MCNC: 0.5 MG/DL (ref 0.3–1.2)
BUN SERPL-MCNC: 12 MG/DL (ref 8–23)
BUN/CREAT SERPL: 15 (ref 9–20)
CALCIUM SERPL-MCNC: 9.6 MG/DL (ref 8.6–10.4)
CHLORIDE SERPL-SCNC: 103 MMOL/L (ref 98–107)
CO2 SERPL-SCNC: 24 MMOL/L (ref 20–31)
CREAT SERPL-MCNC: 0.8 MG/DL (ref 0.7–1.2)
EOSINOPHIL # BLD: <0.03 K/UL (ref 0–0.44)
EOSINOPHILS RELATIVE PERCENT: 0 % (ref 1–4)
ERYTHROCYTE [DISTWIDTH] IN BLOOD BY AUTOMATED COUNT: 13.9 % (ref 11.8–14.4)
GFR, ESTIMATED: >90 ML/MIN/1.73M2
GLUCOSE BLD-MCNC: 138 MG/DL (ref 75–110)
GLUCOSE SERPL-MCNC: 143 MG/DL (ref 70–99)
HCT VFR BLD AUTO: 39.9 % (ref 40.7–50.3)
HGB BLD-MCNC: 12.9 G/DL (ref 13–17)
IMM GRANULOCYTES # BLD AUTO: <0.03 K/UL (ref 0–0.3)
IMM GRANULOCYTES NFR BLD: 0 %
LYMPHOCYTES NFR BLD: 0.49 K/UL (ref 1.1–3.7)
LYMPHOCYTES RELATIVE PERCENT: 8 % (ref 24–43)
MCH RBC QN AUTO: 26.1 PG (ref 25.2–33.5)
MCHC RBC AUTO-ENTMCNC: 32.3 G/DL (ref 25.2–33.5)
MCV RBC AUTO: 80.6 FL (ref 82.6–102.9)
MONOCYTES NFR BLD: 0.56 K/UL (ref 0.1–1.2)
MONOCYTES NFR BLD: 9 % (ref 3–12)
NEUTROPHILS NFR BLD: 83 % (ref 36–65)
NEUTS SEG NFR BLD: 5.33 K/UL (ref 1.5–8.1)
NRBC BLD-RTO: 0 PER 100 WBC
PLATELET # BLD AUTO: 302 K/UL (ref 138–453)
PMV BLD AUTO: 11.5 FL (ref 8.1–13.5)
POTASSIUM SERPL-SCNC: 4.1 MMOL/L (ref 3.7–5.3)
PROT SERPL-MCNC: 7.6 G/DL (ref 6.4–8.3)
RBC # BLD AUTO: 4.95 M/UL (ref 4.21–5.77)
SODIUM SERPL-SCNC: 142 MMOL/L (ref 135–144)
WBC OTHER # BLD: 6.4 K/UL (ref 3.5–11.3)

## 2025-02-15 PROCEDURE — 2580000003 HC RX 258

## 2025-02-15 PROCEDURE — 82947 ASSAY GLUCOSE BLOOD QUANT: CPT

## 2025-02-15 PROCEDURE — 6370000000 HC RX 637 (ALT 250 FOR IP): Performed by: INTERNAL MEDICINE

## 2025-02-15 PROCEDURE — 36415 COLL VENOUS BLD VENIPUNCTURE: CPT

## 2025-02-15 PROCEDURE — 6370000000 HC RX 637 (ALT 250 FOR IP): Performed by: NURSE PRACTITIONER

## 2025-02-15 PROCEDURE — 6370000000 HC RX 637 (ALT 250 FOR IP)

## 2025-02-15 PROCEDURE — 80053 COMPREHEN METABOLIC PANEL: CPT

## 2025-02-15 PROCEDURE — 85025 COMPLETE CBC W/AUTO DIFF WBC: CPT

## 2025-02-15 PROCEDURE — 99238 HOSP IP/OBS DSCHRG MGMT 30/<: CPT | Performed by: FAMILY MEDICINE

## 2025-02-15 PROCEDURE — 9990000011 HC NO CHARGE THERAPY VISIT: Performed by: PHYSICAL THERAPIST

## 2025-02-15 PROCEDURE — 2500000003 HC RX 250 WO HCPCS

## 2025-02-15 PROCEDURE — 6360000002 HC RX W HCPCS

## 2025-02-15 RX ORDER — AZITHROMYCIN 500 MG/1
500 TABLET, FILM COATED ORAL DAILY
Qty: 3 TABLET | Refills: 0 | Status: SHIPPED | OUTPATIENT
Start: 2025-02-16 | End: 2025-02-19

## 2025-02-15 RX ORDER — METOPROLOL SUCCINATE 50 MG/1
50 TABLET, EXTENDED RELEASE ORAL DAILY
Qty: 30 TABLET | Refills: 0 | Status: SHIPPED | OUTPATIENT
Start: 2025-02-15

## 2025-02-15 RX ORDER — CEFDINIR 300 MG/1
300 CAPSULE ORAL 2 TIMES DAILY
Qty: 14 CAPSULE | Refills: 0 | Status: SHIPPED | OUTPATIENT
Start: 2025-02-15 | End: 2025-02-22

## 2025-02-15 RX ORDER — METHYLPREDNISOLONE SODIUM SUCCINATE 40 MG/ML
40 INJECTION INTRAMUSCULAR; INTRAVENOUS EVERY 12 HOURS
Status: DISCONTINUED | OUTPATIENT
Start: 2025-02-15 | End: 2025-02-15 | Stop reason: HOSPADM

## 2025-02-15 RX ORDER — LISINOPRIL 20 MG/1
20 TABLET ORAL DAILY
Status: DISCONTINUED | OUTPATIENT
Start: 2025-02-16 | End: 2025-02-15 | Stop reason: HOSPADM

## 2025-02-15 RX ORDER — MELOXICAM 7.5 MG/1
15 TABLET ORAL DAILY PRN
Status: DISCONTINUED | OUTPATIENT
Start: 2025-02-15 | End: 2025-02-15 | Stop reason: HOSPADM

## 2025-02-15 RX ORDER — BENZONATATE 100 MG/1
100 CAPSULE ORAL 3 TIMES DAILY PRN
Qty: 30 CAPSULE | Refills: 0 | Status: SHIPPED | OUTPATIENT
Start: 2025-02-15 | End: 2025-02-25

## 2025-02-15 RX ADMIN — METOPROLOL SUCCINATE 50 MG: 50 TABLET, EXTENDED RELEASE ORAL at 09:23

## 2025-02-15 RX ADMIN — SODIUM CHLORIDE: 0.9 INJECTION, SOLUTION INTRAVENOUS at 03:39

## 2025-02-15 RX ADMIN — WATER 1000 MG: 1 INJECTION INTRAMUSCULAR; INTRAVENOUS; SUBCUTANEOUS at 13:46

## 2025-02-15 RX ADMIN — ACETAMINOPHEN 650 MG: 325 TABLET ORAL at 03:35

## 2025-02-15 RX ADMIN — LISINOPRIL 10 MG: 5 TABLET ORAL at 09:23

## 2025-02-15 RX ADMIN — ENOXAPARIN SODIUM 40 MG: 100 INJECTION SUBCUTANEOUS at 09:24

## 2025-02-15 RX ADMIN — Medication 1 SPRAY: at 05:19

## 2025-02-15 RX ADMIN — METHYLPREDNISOLONE SODIUM SUCCINATE 40 MG: 40 INJECTION INTRAMUSCULAR; INTRAVENOUS at 06:20

## 2025-02-15 RX ADMIN — MELOXICAM 15 MG: 7.5 TABLET ORAL at 05:18

## 2025-02-15 RX ADMIN — HYDRALAZINE HYDROCHLORIDE 10 MG: 20 INJECTION, SOLUTION INTRAMUSCULAR; INTRAVENOUS at 03:37

## 2025-02-15 RX ADMIN — AZITHROMYCIN DIHYDRATE 500 MG: 250 TABLET, FILM COATED ORAL at 09:23

## 2025-02-15 RX ADMIN — HYDRALAZINE HYDROCHLORIDE 10 MG: 20 INJECTION, SOLUTION INTRAMUSCULAR; INTRAVENOUS at 16:04

## 2025-02-15 ASSESSMENT — PAIN DESCRIPTION - LOCATION: LOCATION: BACK

## 2025-02-15 ASSESSMENT — PAIN - FUNCTIONAL ASSESSMENT: PAIN_FUNCTIONAL_ASSESSMENT: ACTIVITIES ARE NOT PREVENTED

## 2025-02-15 ASSESSMENT — PAIN DESCRIPTION - DESCRIPTORS: DESCRIPTORS: ACHING

## 2025-02-15 ASSESSMENT — PAIN SCALES - GENERAL: PAINLEVEL_OUTOF10: 3

## 2025-02-15 NOTE — PLAN OF CARE
Problem: Discharge Planning  Goal: Discharge to home or other facility with appropriate resources  2/14/2025 2114 by Jenna Wilson RN  Outcome: Progressing  2/14/2025 1338 by Светлана Byers RN  Outcome: Progressing  Flowsheets (Taken 2/14/2025 1334)  Discharge to home or other facility with appropriate resources:   Identify barriers to discharge with patient and caregiver   Arrange for needed discharge resources and transportation as appropriate   Identify discharge learning needs (meds, wound care, etc)   Refer to discharge planning if patient needs post-hospital services based on physician order or complex needs related to functional status, cognitive ability or social support system     Problem: Respiratory - Adult  Goal: Achieves optimal ventilation and oxygenation  2/14/2025 2114 by Jenna Wilson RN  Outcome: Progressing  2/14/2025 1338 by Светлана Byers RN  Outcome: Progressing     Problem: Musculoskeletal - Adult  Goal: Return mobility to safest level of function  2/14/2025 2114 by Jenna Wilson RN  Outcome: Progressing  2/14/2025 1338 by Светлана Byers RN  Outcome: Progressing  Goal: Return ADL status to a safe level of function  2/14/2025 2114 by Jenna Wilson RN  Outcome: Progressing  2/14/2025 1338 by Светлана Byers RN  Outcome: Progressing     Problem: Safety - Adult  Goal: Free from fall injury  2/14/2025 2114 by Jenna Wilson RN  Outcome: Progressing  2/14/2025 1338 by Светлана Byers RN  Outcome: Progressing     Problem: Pain  Goal: Verbalizes/displays adequate comfort level or baseline comfort level  Outcome: Progressing     Problem: Cardiovascular - Adult  Goal: Maintains optimal cardiac output and hemodynamic stability  Outcome: Progressing

## 2025-02-15 NOTE — DISCHARGE INSTRUCTIONS
Dr Obrien's office will call you to schedule a 1 week follow up visit. If you do not receive a phone call, please contact that office at [phone].  Keep your upcoming appt with cardiology.

## 2025-02-15 NOTE — PROGRESS NOTES
Hospitalist Progress Note  2/15/2025 11:58 AM  Subjective:   Admit Date: 2/14/2025  PCP: Vale Obrien, DO    Interval History: Patient is feeling improved is on room air denies chest pain or shortness of breath.BP has been running high.    Diet: ADULT DIET; Regular  Medications:   Scheduled Meds:   methylPREDNISolone  40 mg IntraVENous Q12H    sodium chloride flush  5-40 mL IntraVENous 2 times per day    enoxaparin  40 mg SubCUTAneous Daily    lisinopril  10 mg Oral Daily    cefTRIAXone (ROCEPHIN) 1,000 mg in sterile water 10 mL IV syringe  1,000 mg IntraVENous Q24H    And    azithromycin  500 mg Oral Daily    metoprolol succinate  50 mg Oral Daily    ipratropium 0.5 mg-albuterol 2.5 mg  1 Dose Inhalation 4x Daily RT     Continuous Infusions:   sodium chloride      sodium chloride 100 mL/hr at 02/15/25 0610     CBC:   Recent Labs     02/14/25  0947 02/15/25  0534   WBC 7.3 6.4   HGB 12.4* 12.9*    302     BMP:    Recent Labs     02/14/25  0947 02/15/25  0534    142   K 3.9 4.1    103   CO2 25 24   BUN 13 12   CREATININE 1.0 0.8   GLUCOSE 153* 143*     Hepatic:   Recent Labs     02/14/25  0947 02/15/25  0534   AST 25 39   ALT 23 28   BILITOT 0.5 0.5   ALKPHOS 117 115     Troponin: No results for input(s): \"TROPONINI\" in the last 72 hours.  BNP: No results for input(s): \"BNP\" in the last 72 hours.  Lipids: No results for input(s): \"CHOL\", \"HDL\" in the last 72 hours.    Invalid input(s): \"LDLCALCU\"  INR:   Recent Labs     02/14/25  0947   INR 1.1       Objective:   Vitals: BP (!) 172/97   Pulse 93   Temp 98.1 °F (36.7 °C) (Temporal)   Resp 17   Ht 1.803 m (5' 11\")   Wt 110.3 kg (243 lb 3.2 oz)   SpO2 94%   BMI 33.92 kg/m²   General appearance: alert and cooperative with exam  HEENT: Eye: Normal external eye, conjunctiva, lids cornea, SHANE.  Neck: no adenopathy, no carotid bruit, no JVD, supple, symmetrical, trachea midline, and thyroid not enlarged, symmetric, no  tenderness/mass/nodules  Lungs: clear to auscultation bilaterally  Heart: regular rate and rhythm, S1, S2 normal, no murmur, click, rub or gallop  Abdomen: soft, non-tender; bowel sounds normal; no masses,  no organomegaly  Extremities: extremities normal, atraumatic, no cyanosis or edema  Neurologic: Mental status: Alert, oriented, thought content appropriate    Assessment and Plan:   Pneumonia.  HTN  Vasovagal syncope  PLAN:Will increase Lisinopril to 20 mg and will be on IV hydralazine prn for BP control.Continue rest of current plan.Home soon if remains stable  Patient Active Problem List:     Sick sinus syndrome (HCC)     Pacemaker     RBBB     Gastroesophageal reflux disease     Syncope and collapse     Community acquired pneumonia of both lower lobes     Pneumonia of both lungs due to infectious organism     Essential hypertension     Constipation     Iron deficiency anemia     IFG (impaired fasting glucose)     PSVT (paroxysmal supraventricular tachycardia) (HCC)     Osteoarthritis of left hip     Low HDL (under 40)     Left-sided low back pain without sciatica      Tamiko Link MD, MD  Rounding Hospitalist

## 2025-02-15 NOTE — PLAN OF CARE
Problem: Discharge Planning  Goal: Discharge to home or other facility with appropriate resources  2/15/2025 1050 by Francesca Munoz RN  Outcome: Progressing  Flowsheets (Taken 2/15/2025 0921)  Discharge to home or other facility with appropriate resources: Identify barriers to discharge with patient and caregiver  2/14/2025 2114 by Jenna Wilson RN  Outcome: Progressing     Problem: Respiratory - Adult  Goal: Achieves optimal ventilation and oxygenation  2/15/2025 1050 by Francesca Munoz RN  Outcome: Progressing  2/14/2025 2114 by Jenna Wilson RN  Outcome: Progressing     Problem: Musculoskeletal - Adult  Goal: Return mobility to safest level of function  2/15/2025 1050 by Francesca Munoz RN  Outcome: Progressing  2/14/2025 2114 by Jenna Wislon RN  Outcome: Progressing  Goal: Return ADL status to a safe level of function  2/15/2025 1050 by Francesca Munoz RN  Outcome: Progressing  2/14/2025 2114 by Jenna Wilson RN  Outcome: Progressing     Problem: Safety - Adult  Goal: Free from fall injury  2/15/2025 1050 by Francesca Munoz RN  Outcome: Progressing  2/14/2025 2114 by Jenna Wilson RN  Outcome: Progressing     Problem: Pain  Goal: Verbalizes/displays adequate comfort level or baseline comfort level  2/15/2025 1050 by Francesca Munoz RN  Outcome: Progressing  2/14/2025 2114 by Jenna Wilson RN  Outcome: Progressing     Problem: Cardiovascular - Adult  Goal: Maintains optimal cardiac output and hemodynamic stability  2/15/2025 1050 by Francesca Munoz RN  Outcome: Progressing  2/14/2025 2114 by Jenna Wilson RN  Outcome: Progressing

## 2025-02-15 NOTE — FLOWSHEET NOTE
02/14/25 2208   Vital Signs   Orthostatic B/P and Pulse? Yes   Blood Pressure Lying 157/95   Pulse Lying 86 PER MINUTE   Blood Pressure Sitting 189/100   Pulse Sitting 89 PER MINUTE   Blood Pressure Standing 176/109   Pulse Standing 90 PER MINUTE

## 2025-02-15 NOTE — PROGRESS NOTES
Physical Therapy  Patient denies need for PT at this time. States he has not had any significant changes in strength, stability, balance, or proprioception recently. Patient states that he has a known problem with areas that get too warm causing him to feel nauseous and then passing out briefly, which is what he feels happened in the Urgent Care yesterday. Patient stated he had a previous work injury that caused significant arthritis in L hip, causing occasional soreness and pain, but does not currently limit his necessary and routine ADLs and function. PT observed patient stand, ambulate across room, sit, and performed basic LE strength MMT with results being 4+ or 5/5 in all motions and no concerns for patient strength, balance, or safety at this time.    Discontinuing PT orders due to patient being at baseline function at this time.    Sukumar Grady, PT, DPT

## 2025-02-15 NOTE — DISCHARGE SUMMARY
Discharge Summary    Hugh Pandya Patient Status:  Inpatient    1960 MRN [id number]   Location [facility]  PROGRESSIVE CARE Attending Kumar Rodriguez MD   Hosp Day # 1 PCP Vale Obrien DO     Date of Admission: 2025    Date of Discharge: 2/15/2025    Admitting Diagnosis: Syncope and collapse [R55]  Pneumonia of both lungs due to infectious organism, unspecified part of lung [J18.9]  Community acquired pneumonia of both lower lobes [J18.9]    Discharge Diagnosis: Principal Problem:    Community acquired pneumonia of both lower lobes  Active Problems:    Syncope and collapse    Pneumonia of both lungs due to infectious organism    Constipation  Resolved Problems:    * No resolved hospital problems. *  HTN    Reason for Admission/HPI: Cough and body aches was seen in  and he had passed out briefly while he was getting a nasal swab done likely vasovagal has h/o sick sinus syndrome and is on pacemaker    Hospital Course: Patient was on IV rocephin and Zithromax and solumedrol his BP was elevated so hois metoprolol dose was increased and was on IV hydralazine prn ,Received lactulose for constipation with improvement.Patient is feeling improved his wbc count is normal has been afebrile.  He will follow up with cardiology outpatient for his likely vasovagal episode and HTN and h/o pacemaker placement    Consultations: None    Procedures: None    Complications: None    Disposition: Home    Discharge Condition: GOOD    Discharge Medications:      Medication List        START taking these medications      azithromycin 500 MG tablet  Commonly known as: ZITHROMAX  Take 1 tablet by mouth daily for 3 doses  Start taking on: 2025     benzonatate 100 MG capsule  Commonly known as: TESSALON  Take 1 capsule by mouth 3 times daily as needed for Cough     cefdinir 300 MG capsule  Commonly known as: OMNICEF  Take 1 capsule by mouth 2 times daily for 7 days            CHANGE how you take these medications

## 2025-02-15 NOTE — PROGRESS NOTES
Calista Francis, PPatient Assessment complete. Syncope and collapse [R55]  Pneumonia of both lungs due to infectious organism, unspecified part of lung [J18.9]  Community acquired pneumonia of both lower lobes [J18.9] .   Vitals:    02/15/25 1355   BP: (!) 160/90   Pulse: 84   Resp:    Temp:    SpO2:    . Patients home meds are   Prior to Admission medications    Medication Sig Start Date End Date Taking? Authorizing Provider   polyethylene glycol (GLYCOLAX) 17 g packet Take 1 packet by mouth daily   Yes Provider, MD Earlene   meloxicam (MOBIC) 15 MG tablet Take 1 tablet by mouth daily 1/7/25  Yes Awilda Patrick APRN - CNP   lisinopril (PRINIVIL;ZESTRIL) 10 MG tablet TAKE 1 TABLET DAILY 8/9/24  Yes Rhonda Sandoval APRN - CNP   metoprolol succinate (TOPROL XL) 25 MG extended release tablet Take 1 tablet by mouth daily 4/29/24  Yes Everett Tran S, DO   .  Recent Surgical History: None = 0     Assessment     RR 20  Breath Sounds: clear      Bronchodilator assessment at level  3  Hyperinflation assessment at level 1  Secretion Management assessment at level  1    [x]    Bronchodilator Assessment  BRONCHODILATOR ASSESSMENT SCORE  Score 0 1 2 3 4 5   Breath Sounds   []  Patient Baseline []  No Wheeze good aeration []  Faint, scattered wheezing, good aeration [x]  Expiratory Wheezing and or moderately diminished []  Insp/Exp wheeze and/or very diminished []  Insp/Exp and/ or marked distress   Respiratory Rate   []  Patient Baseline []  Less than 20 []  Less than 20 [x]  20-25 []  Greater than 25 []  Greater than 25   Peak flow % of Pred or PB [x]  NA   []  Greater than 90%  []  81-90% []  71-80% []  Less than or equal to 70%  or unable to perform []  Unable due to Respiratory Distress   Dyspnea re []  Patient Baseline []  No SOB []  No SOB [x]  SOB on exertion []  SOB min activity []  At rest/acute   e FEV% Predicted       [x]  NA []  Above 69%  []  Unable []  Above 60-69%  []  Unable []  Above

## 2025-02-16 LAB
MICROORGANISM SPEC CULT: NORMAL
MICROORGANISM SPEC CULT: NORMAL
SERVICE CMNT-IMP: NORMAL
SERVICE CMNT-IMP: NORMAL
SPECIMEN DESCRIPTION: NORMAL
SPECIMEN DESCRIPTION: NORMAL

## 2025-02-17 ENCOUNTER — TELEPHONE (OUTPATIENT)
Dept: FAMILY MEDICINE CLINIC | Age: 65
End: 2025-02-17

## 2025-02-17 ENCOUNTER — CARE COORDINATION (OUTPATIENT)
Dept: CARE COORDINATION | Age: 65
End: 2025-02-17

## 2025-02-17 LAB
EKG ATRIAL RATE: 83 BPM
EKG ATRIAL RATE: 84 BPM
EKG P AXIS: 36 DEGREES
EKG P AXIS: 45 DEGREES
EKG P-R INTERVAL: 158 MS
EKG P-R INTERVAL: 158 MS
EKG Q-T INTERVAL: 406 MS
EKG Q-T INTERVAL: 420 MS
EKG QRS DURATION: 152 MS
EKG QRS DURATION: 152 MS
EKG QTC CALCULATION (BAZETT): 479 MS
EKG QTC CALCULATION (BAZETT): 493 MS
EKG R AXIS: -39 DEGREES
EKG R AXIS: -42 DEGREES
EKG T AXIS: 27 DEGREES
EKG T AXIS: 29 DEGREES
EKG VENTRICULAR RATE: 83 BPM
EKG VENTRICULAR RATE: 84 BPM

## 2025-02-17 NOTE — CARE COORDINATION
Care Transitions Note    Initial Call - Call within 2 business days of discharge: Yes    Patient Current Location:  Home: [address] [place] [address]    LPN Care Coordinator contacted the patient by telephone to perform post hospital discharge assessment, verified name and  as identifiers. Provided introduction to self, and explanation of the LPN Care Coordinator role.     Patient: Hugh Pandya    Patient : 1960   MRN: [id number]    Reason for Admission: Pneumonia of both lungs due to infectious organism, unspecified part of lung ...  Discharge Date: 2/15/25  RURS: Readmission Risk Score: 5.4      Last Discharge Facility       Date Complaint Diagnosis Description Type Department Provider    25 Loss of Consciousness; Nausea Pneumonia of both lungs due to infectious organism, unspecified part of lung ... ED to Hosp-Admission (Discharged) (ADMITTED) Kumar Camacho MD; Terrie ...            Was this an external facility discharge? No    Additional needs identified to be addressed with provider   Please call pt for sooner hospital writer scheduled for 3/14               Method of communication with provider: chart routing.    Patients top risk factors for readmission: medical condition-PNE    Interventions to address risk factors:   Review of patient management of conditions/medications: PNE    Care Summary Note: Writer spoke to Hugh for initial transitional care call. He was admitted 25-2/15/25 [place] dx PNE bilaterally. He states that he is feeling much better. Still has a productive cough with clear phlegm. He has all medications Zithromax , tessalon and cefdinir. He denies fever , chills. States bowels are moving, voiding fine. States has chronic arthritic hip pain tylenol helps. Writer scheduled soonest hospital follow up via book it. Aware that he may receive a call from PCP office for sooner appointment still drives. Denies any needs/concerns at this time. Does agree

## 2025-02-21 ENCOUNTER — OFFICE VISIT (OUTPATIENT)
Dept: FAMILY MEDICINE CLINIC | Age: 65
End: 2025-02-21

## 2025-02-21 VITALS
DIASTOLIC BLOOD PRESSURE: 80 MMHG | OXYGEN SATURATION: 95 % | WEIGHT: 248.3 LBS | BODY MASS INDEX: 34.76 KG/M2 | TEMPERATURE: 98.6 F | SYSTOLIC BLOOD PRESSURE: 162 MMHG | HEART RATE: 68 BPM | HEIGHT: 71 IN | RESPIRATION RATE: 18 BRPM

## 2025-02-21 DIAGNOSIS — J18.9 COMMUNITY ACQUIRED PNEUMONIA OF BOTH LOWER LOBES: ICD-10-CM

## 2025-02-21 DIAGNOSIS — R49.9 CHANGE IN VOICE: ICD-10-CM

## 2025-02-21 DIAGNOSIS — R09.89 PHLEGM IN THROAT: ICD-10-CM

## 2025-02-21 DIAGNOSIS — Z09 HOSPITAL DISCHARGE FOLLOW-UP: Primary | ICD-10-CM

## 2025-02-21 DIAGNOSIS — R05.3 PERSISTENT COUGH: ICD-10-CM

## 2025-02-21 RX ORDER — OMEPRAZOLE 40 MG/1
40 CAPSULE, DELAYED RELEASE ORAL 2 TIMES DAILY
Qty: 60 CAPSULE | Refills: 1 | Status: SHIPPED | OUTPATIENT
Start: 2025-02-21

## 2025-02-21 NOTE — PROGRESS NOTES
Post-Discharge Transitional Care  Follow Up      Hugh Pandya   YOB: 1960    Date of Office Visit:  2/21/2025  Date of Hospital Admission: 2/14/25  Date of Hospital Discharge: 2/15/25  Risk of hospital readmission (high >=14%. Medium >=10%) :Readmission Risk Score: 5.4      Care management risk score Rising risk (score 2-5) and Complex Care (Scores >=6): No Risk Score On File     Non face to face  following discharge, date last encounter closed (first attempt may have been earlier): 02/17/2025    Call initiated 2 business days of discharge: Yes    ASSESSMENT/PLAN:   Hospital discharge follow-up  -     VT DISCHARGE MEDS RECONCILED W/ CURRENT OUTPATIENT MED LIST  Community acquired pneumonia of both lower lobes  Change in voice  -     External Referral To ENT  -     omeprazole (PRILOSEC) 40 MG delayed release capsule; Take 1 capsule by mouth in the morning and at bedtime, Disp-60 capsule, R-1Normal  Persistent cough  -     External Referral To ENT  -     omeprazole (PRILOSEC) 40 MG delayed release capsule; Take 1 capsule by mouth in the morning and at bedtime, Disp-60 capsule, R-1Normal  Phlegm in throat  -     External Referral To ENT  -     omeprazole (PRILOSEC) 40 MG delayed release capsule; Take 1 capsule by mouth in the morning and at bedtime, Disp-60 capsule, R-1Normal    Medical Decision Making: moderate complexity  Return if symptoms worsen or fail to improve, for already scheduled OV on 6/20/25.           Subjective:   HPI:  Follow up of Hospital problems/diagnosis(es):     History of Present Illness  The patient is a 64-year-old male who presents today for a hospital follow-up for pneumonia.    He reports significant improvement in his condition following his recent hospitalization for pneumonia. He was admitted to the hospital on 02/14/2025 after experiencing syncope at an urgent care facility. The subsequent emergency room visit led to the diagnosis of pneumonia, confirmed by lung

## 2025-02-21 NOTE — PROGRESS NOTES
Writer performed walk test with pt around the unit. Beginning oxygen saturation 95% on room air. Pt ambulated without difficulty, no SOB or distress noted. Ending oxygen 96-97% on room air.

## 2025-02-24 ENCOUNTER — OFFICE VISIT (OUTPATIENT)
Dept: CARDIOLOGY | Age: 65
End: 2025-02-24
Payer: MEDICARE

## 2025-02-24 ENCOUNTER — PROCEDURE VISIT (OUTPATIENT)
Dept: CARDIOLOGY | Age: 65
End: 2025-02-24
Payer: MEDICARE

## 2025-02-24 VITALS
WEIGHT: 240 LBS | HEART RATE: 63 BPM | DIASTOLIC BLOOD PRESSURE: 80 MMHG | HEIGHT: 71 IN | OXYGEN SATURATION: 98 % | SYSTOLIC BLOOD PRESSURE: 154 MMHG | BODY MASS INDEX: 33.6 KG/M2

## 2025-02-24 DIAGNOSIS — Z95.0 PACEMAKER: ICD-10-CM

## 2025-02-24 DIAGNOSIS — I49.5 SICK SINUS SYNDROME (HCC): ICD-10-CM

## 2025-02-24 DIAGNOSIS — I49.5 SICK SINUS SYNDROME (HCC): Primary | ICD-10-CM

## 2025-02-24 DIAGNOSIS — I47.10 PAROXYSMAL SVT (SUPRAVENTRICULAR TACHYCARDIA): ICD-10-CM

## 2025-02-24 DIAGNOSIS — R94.31 ABNORMAL ECG: ICD-10-CM

## 2025-02-24 DIAGNOSIS — I45.10 RBBB: ICD-10-CM

## 2025-02-24 DIAGNOSIS — I10 HYPERTENSION, ESSENTIAL: ICD-10-CM

## 2025-02-24 DIAGNOSIS — Z95.0 S/P PLACEMENT OF CARDIAC PACEMAKER: ICD-10-CM

## 2025-02-24 DIAGNOSIS — I10 ESSENTIAL HYPERTENSION: Primary | ICD-10-CM

## 2025-02-24 PROCEDURE — 99214 OFFICE O/P EST MOD 30 MIN: CPT | Performed by: INTERNAL MEDICINE

## 2025-02-24 PROCEDURE — 3079F DIAST BP 80-89 MM HG: CPT | Performed by: INTERNAL MEDICINE

## 2025-02-24 PROCEDURE — 1123F ACP DISCUSS/DSCN MKR DOCD: CPT | Performed by: INTERNAL MEDICINE

## 2025-02-24 PROCEDURE — 1036F TOBACCO NON-USER: CPT | Performed by: INTERNAL MEDICINE

## 2025-02-24 PROCEDURE — 3077F SYST BP >= 140 MM HG: CPT | Performed by: INTERNAL MEDICINE

## 2025-02-24 PROCEDURE — G8427 DOCREV CUR MEDS BY ELIG CLIN: HCPCS | Performed by: INTERNAL MEDICINE

## 2025-02-24 PROCEDURE — 3017F COLORECTAL CA SCREEN DOC REV: CPT | Performed by: INTERNAL MEDICINE

## 2025-02-24 PROCEDURE — G8417 CALC BMI ABV UP PARAM F/U: HCPCS | Performed by: INTERNAL MEDICINE

## 2025-02-24 PROCEDURE — 93288 INTERROG EVL PM/LDLS PM IP: CPT | Performed by: INTERNAL MEDICINE

## 2025-02-24 PROCEDURE — 1111F DSCHRG MED/CURRENT MED MERGE: CPT | Performed by: INTERNAL MEDICINE

## 2025-02-24 RX ORDER — LISINOPRIL 20 MG/1
20 TABLET ORAL DAILY
Qty: 90 TABLET | Refills: 3 | Status: SHIPPED | OUTPATIENT
Start: 2025-02-24

## 2025-02-24 NOTE — PROGRESS NOTES
[facility]  [facility] CARDIOLOGY A DEPARTMENT OF [address] [address]  Dept: [phone]    CC: follow up for PPM    HPI:  The patient is a 64 y.o. year old, , male is in the office for a follow up visit.  Has some weakness from pneumonia.   Got sick last week.   No cp, sob, palpitations, le edema.      Past Medical History:   has a past medical history of Allergic rhinitis, Fainting, Granuloma, skin, Hypertension, Measles, Mumps, and Sick sinus syndrome (HCC).    Past Surgical History:   has a past surgical history that includes Colonoscopy (02/08/2011); pacemaker placement (8/4/2006); pacemaker placement (9/15/2014); toenail excision (Left, 09/12/78 ); and Colonoscopy (N/A, 2/23/2022).    Home Medications:  Prior to Admission medications    Medication Sig Start Date End Date Taking? Authorizing Provider   omeprazole (PRILOSEC) 40 MG delayed release capsule Take 1 capsule by mouth in the morning and at bedtime 2/21/25  Yes Vale Obrien DO   metoprolol succinate (TOPROL XL) 50 MG extended release tablet Take 1 tablet by mouth daily 2/15/25  Yes Tamiko Link MD   benzonatate (TESSALON) 100 MG capsule Take 1 capsule by mouth 3 times daily as needed for Cough 2/15/25 2/25/25 Yes Tamiko Link MD   polyethylene glycol (GLYCOLAX) 17 g packet Take 1 packet by mouth daily   Yes Provider, MD Earlene   meloxicam (MOBIC) 15 MG tablet Take 1 tablet by mouth daily 1/7/25  Yes Awilda Patrick APRN - CNP   lisinopril (PRINIVIL;ZESTRIL) 10 MG tablet TAKE 1 TABLET DAILY 8/9/24  Yes Rhonda Sandoval APRN - CNP       Allergies:  Patient has no known allergies.    Social History:   reports that he has never smoked. He has been exposed to tobacco smoke. He has never used smokeless tobacco. He reports that he does not currently use alcohol. He reports that he does not use

## 2025-02-25 ENCOUNTER — CARE COORDINATION (OUTPATIENT)
Dept: CARE COORDINATION | Age: 65
End: 2025-02-25

## 2025-02-25 NOTE — CARE COORDINATION
Care Transitions Note    Follow Up Call     Patient Current Location:  Home: [address] [place] [address]    Care Transition Nurse contacted the patient by telephone. Verified name and  as identifiers.    Additional needs identified to be addressed with provider   No needs identified                 Method of communication with provider: none.    Care Summary Note: Was able to contact Hugh for transitional outreach.  He said that he still has an occasional cough, has shortness of breath if he does something too physical, no fever/chills and his oxygen saturation are 96-97%.  He said that he now has a hoarse voice from the drainage in the back of his throat and some sinus pressure.  He will be seeing an ENT doctor on 3/18.  His lisinopril dosage was increased due to his high blood pressure and omeprazole was added for his cough.  He said that if he eats before he goes to bed, he will have nausea and want to pass out due to the elevated BP from eating.  He will have to sit in front of a fan to keep from passing out.   Asked if he is to call cardio back with his BP's after being on the higher dose of lisinopril, to see if it is working.  He said no.  He will be following up with them in 3 months.  He had no further questions or concerns.     Plan of care updates since last contact:  Follow up appointments with cardio/PCP       Advance Care Planning:   Does patient have an Advance Directive: reviewed during previous call, see note. .    Medication Review:  Medications changed since last call, reviewed today.     Remote Patient Monitoring:  Offered patient enrollment in the Remote Patient Monitoring (RPM) program for in-home monitoring: deferred    Assessments:  Care Transitions Subsequent and Final Call    Subsequent and Final Calls  Do you have any ongoing symptoms?: Yes  Onset of Patient-reported symptoms: In the past 7 days  Patient-reported symptoms: Other  Have your medications changed?: Yes  Patient

## 2025-02-28 ASSESSMENT — ENCOUNTER SYMPTOMS
COUGH: 1
VOICE CHANGE: 1

## 2025-03-04 ENCOUNTER — CARE COORDINATION (OUTPATIENT)
Dept: CARE COORDINATION | Age: 65
End: 2025-03-04

## 2025-03-04 NOTE — CARE COORDINATION
6/20/2025 8:40 AM Vale Obrien DO Family Medicine [phone]    8/25/2025 10:15 AM SCHEDULE, PACEMAKER [facility] Cardiology [phone]    8/25/2025 10:30 AM Abdulaziz Tran DO Cardiology [phone]            Care Transition Nurse provided contact information.  Plan for follow-up call in 6-10 days based on severity of symptoms and risk factors.  Plan for next call: symptom management-follow up on BP and any respiratory problems from pneumonia      GABRIELA FELDMAN RN

## 2025-03-11 ENCOUNTER — CARE COORDINATION (OUTPATIENT)
Dept: CARE COORDINATION | Age: 65
End: 2025-03-11

## 2025-03-11 NOTE — CARE COORDINATION
Care Transitions Note    Follow Up Call     Attempted to reach patient for transitions of care follow up.  Unable to reach patient.      Outreach Attempts:   HIPAA compliant voicemail left for patient.     Care Summary Note: 1st attempt    Follow Up Appointment:   Future Appointments         Provider Specialty Dept Phone    5/27/2025 10:00 AM SCHEDULE, PACEMAKER [facility] CARDIOLOGY Cardiology [phone]    5/27/2025 11:45 AM Everett Tran DO Cardiology [phone]    6/20/2025 8:40 AM Vale Obrien DO Family Medicine [phone]    8/25/2025 10:15 AM SCHEDULE, PACEMAKER [facility] CARDIOLOGY Cardiology [phone]    8/25/2025 10:30 AM Abdulaziz Tran DO Cardiology [phone]            Plan for follow-up on next business day.  based on severity of symptoms and risk factors. Plan for next call: symptom management-follow up on pneumonia, BP    GABRIELA FELDMAN RN

## 2025-03-12 ENCOUNTER — CARE COORDINATION (OUTPATIENT)
Dept: CARE COORDINATION | Age: 65
End: 2025-03-12

## 2025-03-12 NOTE — CARE COORDINATION
Care Transitions Note    Follow Up Call     Attempted to reach patient for transitions of care follow up.  Unable to reach patient.      Outreach Attempts:   Multiple attempts to contact patient at phone numbers on file.   HIPAA compliant voicemail left for patient.     Care Summary Note: final attempt    Follow Up Appointment:   Future Appointments         Provider Specialty Dept Phone    5/27/2025 10:00 AM SCHEDULE, PACEMAKER [facility] CARDIOLOGY Cardiology [phone]    5/27/2025 11:45 AM Everett Tran DO Cardiology [phone]    6/20/2025 8:40 AM Vale Obrien DO Family Medicine [phone]    8/25/2025 10:15 AM SCHEDULE, PACEMAKER [facility] CARDIOLOGY Cardiology [phone]    8/25/2025 10:30 AM Abdulaziz Tran DO Cardiology [phone]            No further follow-up call indicated based on severity of symptoms and risk factors. Plan for next call:  final attempt    GABRIELA FELDMAN RN

## 2025-03-20 ENCOUNTER — TELEPHONE (OUTPATIENT)
Dept: CARDIOLOGY | Age: 65
End: 2025-03-20

## 2025-03-20 RX ORDER — METOPROLOL SUCCINATE 50 MG/1
50 TABLET, EXTENDED RELEASE ORAL DAILY
Qty: 30 TABLET | Refills: 0 | Status: CANCELLED | OUTPATIENT
Start: 2025-03-20

## 2025-03-20 NOTE — TELEPHONE ENCOUNTER
Dr. Obrien out of office until 3/28/25, patient also notified his cardiologist of this medication need today. He has seen them most recently so will wait and see if they want to make any adjustments as pt notified them his BP is still running high as well.

## 2025-03-20 NOTE — TELEPHONE ENCOUNTER
Pt has only 14 pills left of Toprol XL 50 mg  and it was filled last time by on call doc in Hospital which was Dr. Link and his PCP Dr. Obrien will be out the next couple of days.       RX [facility]

## 2025-03-20 NOTE — TELEPHONE ENCOUNTER
169/100    Pt comes to window in regards to his metoprolol. It was increased during his recent hospitalization by Dr. Link. Pt is running low on medication and reports that his BP is still running high. This morning it was 169/100, and hasn't really notice any changes since his lisinopril was increased also. Please advise on medications and refill per your guidance. Thank you

## 2025-03-21 RX ORDER — AMLODIPINE BESYLATE 5 MG/1
5 TABLET ORAL DAILY
Qty: 90 TABLET | Refills: 3 | Status: SHIPPED | OUTPATIENT
Start: 2025-03-21

## 2025-03-24 NOTE — TELEPHONE ENCOUNTER
Tried to reach pt by phone.  NA.  I called pharmacy at [facility] and confirmed that pt HAS picked up the med per Bud.

## 2025-04-21 ENCOUNTER — PATIENT MESSAGE (OUTPATIENT)
Dept: FAMILY MEDICINE CLINIC | Age: 65
End: 2025-04-21

## 2025-04-28 RX ORDER — MELOXICAM 15 MG/1
15 TABLET ORAL DAILY
Qty: 30 TABLET | Refills: 0 | OUTPATIENT
Start: 2025-04-28

## 2025-05-27 ENCOUNTER — PROCEDURE VISIT (OUTPATIENT)
Dept: CARDIOLOGY | Age: 65
End: 2025-05-27
Payer: MEDICARE

## 2025-05-27 ENCOUNTER — OFFICE VISIT (OUTPATIENT)
Dept: CARDIOLOGY | Age: 65
End: 2025-05-27
Payer: MEDICARE

## 2025-05-27 VITALS
BODY MASS INDEX: 33.74 KG/M2 | DIASTOLIC BLOOD PRESSURE: 86 MMHG | SYSTOLIC BLOOD PRESSURE: 142 MMHG | HEART RATE: 66 BPM | WEIGHT: 241 LBS | OXYGEN SATURATION: 93 % | HEIGHT: 71 IN

## 2025-05-27 DIAGNOSIS — I49.5 SICK SINUS SYNDROME (HCC): Primary | ICD-10-CM

## 2025-05-27 DIAGNOSIS — I10 ESSENTIAL HYPERTENSION: ICD-10-CM

## 2025-05-27 DIAGNOSIS — I49.5 SICK SINUS SYNDROME (HCC): ICD-10-CM

## 2025-05-27 DIAGNOSIS — Z95.0 PACEMAKER: Primary | ICD-10-CM

## 2025-05-27 DIAGNOSIS — I45.10 RBBB: ICD-10-CM

## 2025-05-27 DIAGNOSIS — I10 HYPERTENSION, ESSENTIAL: ICD-10-CM

## 2025-05-27 DIAGNOSIS — I47.10 PAROXYSMAL SVT (SUPRAVENTRICULAR TACHYCARDIA): ICD-10-CM

## 2025-05-27 PROCEDURE — 1123F ACP DISCUSS/DSCN MKR DOCD: CPT | Performed by: INTERNAL MEDICINE

## 2025-05-27 PROCEDURE — G8417 CALC BMI ABV UP PARAM F/U: HCPCS | Performed by: INTERNAL MEDICINE

## 2025-05-27 PROCEDURE — 93289 INTERROG DEVICE EVAL HEART: CPT | Performed by: INTERNAL MEDICINE

## 2025-05-27 PROCEDURE — 99214 OFFICE O/P EST MOD 30 MIN: CPT | Performed by: INTERNAL MEDICINE

## 2025-05-27 PROCEDURE — 3077F SYST BP >= 140 MM HG: CPT | Performed by: INTERNAL MEDICINE

## 2025-05-27 PROCEDURE — G8427 DOCREV CUR MEDS BY ELIG CLIN: HCPCS | Performed by: INTERNAL MEDICINE

## 2025-05-27 PROCEDURE — 99212 OFFICE O/P EST SF 10 MIN: CPT | Performed by: INTERNAL MEDICINE

## 2025-05-27 PROCEDURE — 3079F DIAST BP 80-89 MM HG: CPT | Performed by: INTERNAL MEDICINE

## 2025-05-27 PROCEDURE — 93005 ELECTROCARDIOGRAM TRACING: CPT | Performed by: INTERNAL MEDICINE

## 2025-05-27 PROCEDURE — 93010 ELECTROCARDIOGRAM REPORT: CPT | Performed by: INTERNAL MEDICINE

## 2025-05-27 PROCEDURE — 1036F TOBACCO NON-USER: CPT | Performed by: INTERNAL MEDICINE

## 2025-05-27 PROCEDURE — 3017F COLORECTAL CA SCREEN DOC REV: CPT | Performed by: INTERNAL MEDICINE

## 2025-05-27 NOTE — PROGRESS NOTES
[facility] CARE, [facility]  [facility] CARDIOLOGY A DEPARTMENT OF [facility]  [address] [facility] [address]  Dept: [phone]    CC: follow up for PPM    HPI:  The patient is a 64 y.o. year old, , male is in the office for a follow up visit post recent hospital admission with pneumonia and for pacemaker check and follow-up.    He is doing quite well from cardiac standpoint.  He denies any chest pain or discomfort.  No significant dyspnea on exertion orthopnea or PND.  Denies any palpitations dizziness or syncope.    His blood pressure has been much better controlled after switching metoprolol to amlodipine and it is running in the 130s over 80s now at home      Past Medical History:   has a past medical history of Allergic rhinitis, Fainting, Granuloma, skin, Hypertension, Measles, Mumps, and Sick sinus syndrome (HCC).    Past Surgical History:   has a past surgical history that includes Colonoscopy (02/08/2011); pacemaker placement (8/4/2006); pacemaker placement (9/15/2014); toenail excision (Left, 09/12/78 ); and Colonoscopy (N/A, 2/23/2022).    Home Medications:  Prior to Admission medications    Medication Sig Start Date End Date Taking? Authorizing Provider   amLODIPine (NORVASC) 5 MG tablet Take 1 tablet by mouth daily 3/21/25   Abdulaziz Tran DO   lisinopril (PRINIVIL;ZESTRIL) 20 MG tablet Take 1 tablet by mouth daily 2/24/25   Abdulaziz Tran DO   omeprazole (PRILOSEC) 40 MG delayed release capsule Take 1 capsule by mouth in the morning and at bedtime 2/21/25   Vale Obrien DO   metoprolol succinate (TOPROL XL) 50 MG extended release tablet Take 1 tablet by mouth daily 2/15/25   Tamiko Link MD   polyethylene glycol (GLYCOLAX) 17 g packet Take 1 packet by mouth daily    Provider, MD Earlene   meloxicam (MOBIC) 15 MG tablet Take 1 tablet by mouth daily 1/7/25   Awilda Patrick, APRN - CNP

## 2025-06-18 DIAGNOSIS — M25.551 RIGHT HIP PAIN: Primary | ICD-10-CM

## 2025-06-19 ENCOUNTER — OFFICE VISIT (OUTPATIENT)
Dept: ORTHOPEDIC SURGERY | Age: 65
End: 2025-06-19
Payer: MEDICARE

## 2025-06-19 ENCOUNTER — HOSPITAL ENCOUNTER (OUTPATIENT)
Dept: GENERAL RADIOLOGY | Age: 65
Discharge: HOME | End: 2025-06-21
Payer: MEDICARE

## 2025-06-19 VITALS
SYSTOLIC BLOOD PRESSURE: 138 MMHG | OXYGEN SATURATION: 96 % | WEIGHT: 241 LBS | HEIGHT: 71 IN | RESPIRATION RATE: 18 BRPM | BODY MASS INDEX: 33.74 KG/M2 | HEART RATE: 68 BPM | DIASTOLIC BLOOD PRESSURE: 88 MMHG

## 2025-06-19 DIAGNOSIS — M25.551 RIGHT HIP PAIN: ICD-10-CM

## 2025-06-19 DIAGNOSIS — M16.0 BILATERAL PRIMARY OSTEOARTHRITIS OF HIP: Primary | ICD-10-CM

## 2025-06-19 PROCEDURE — 99213 OFFICE O/P EST LOW 20 MIN: CPT | Performed by: NURSE PRACTITIONER

## 2025-06-19 PROCEDURE — 3079F DIAST BP 80-89 MM HG: CPT | Performed by: NURSE PRACTITIONER

## 2025-06-19 PROCEDURE — 1036F TOBACCO NON-USER: CPT | Performed by: NURSE PRACTITIONER

## 2025-06-19 PROCEDURE — G8417 CALC BMI ABV UP PARAM F/U: HCPCS | Performed by: NURSE PRACTITIONER

## 2025-06-19 PROCEDURE — 73502 X-RAY EXAM HIP UNI 2-3 VIEWS: CPT

## 2025-06-19 PROCEDURE — 3075F SYST BP GE 130 - 139MM HG: CPT | Performed by: NURSE PRACTITIONER

## 2025-06-19 PROCEDURE — 99214 OFFICE O/P EST MOD 30 MIN: CPT | Performed by: NURSE PRACTITIONER

## 2025-06-19 PROCEDURE — G8427 DOCREV CUR MEDS BY ELIG CLIN: HCPCS | Performed by: NURSE PRACTITIONER

## 2025-06-19 PROCEDURE — 1123F ACP DISCUSS/DSCN MKR DOCD: CPT | Performed by: NURSE PRACTITIONER

## 2025-06-19 PROCEDURE — 3017F COLORECTAL CA SCREEN DOC REV: CPT | Performed by: NURSE PRACTITIONER

## 2025-06-19 RX ORDER — CYCLOBENZAPRINE HCL 10 MG
10 TABLET ORAL 3 TIMES DAILY PRN
Qty: 40 TABLET | Refills: 1 | Status: SHIPPED | OUTPATIENT
Start: 2025-06-19 | End: 2025-07-16

## 2025-06-19 RX ORDER — CELECOXIB 200 MG/1
200 CAPSULE ORAL 2 TIMES DAILY
Qty: 60 CAPSULE | Refills: 2 | Status: SHIPPED | OUTPATIENT
Start: 2025-06-19

## 2025-06-19 RX ORDER — FAMOTIDINE 40 MG/1
40 TABLET, FILM COATED ORAL NIGHTLY
COMMUNITY
Start: 2025-06-10

## 2025-06-19 NOTE — PROGRESS NOTES
05:34 AM     PT/INR:    Lab Results   Component Value Date/Time    PROTIME 13.7 02/14/2025 09:47 AM    INR 1.1 02/14/2025 09:47 AM     Troponin:  No results found for: \"TROPONINI\"  No results for input(s): \"LIPASE\", \"AMYLASE\" in the last 72 hours.  No results for input(s): \"AST\", \"ALT\", \"BILIDIR\", \"BILITOT\", \"ALKPHOS\" in the last 72 hours.  Uric Acid:  No components found for: \"URIC\"  Urine Culture:  No components found for: \"CURINE\"    Radiology:   No results found.    X-rays personally reviewed by me of x-rays AP of the pelvis 2 views of the right hip does show osteoarthritis within bilateral hip joints.  No acute fractures.       Diagnosis Orders   1. Bilateral primary osteoarthritis of hip              PLAN:  Plan at this time we will stop the Mobic send in Celebrex to take as needed for pain.  Will also send in Flexeril for him to take at bedtime to help relax his muscles and spasming's.  Will see him back as needed for any further issues.  We did discuss if his pain continues he may call in and we can schedule a radiologist corticosteroid hip injection under fluoroscopy.  We did also discuss proceeding with hip replacements if needed.  Weight-bear as tolerated activities as tolerated.    No orders of the defined types were placed in this encounter.       Procedure:        Electronically signed by JAMEEL Roper CNP on 6/19/2025 at 9:11 AM

## 2025-08-25 ENCOUNTER — PROCEDURE VISIT (OUTPATIENT)
Dept: CARDIOLOGY | Age: 65
End: 2025-08-25
Payer: MEDICARE

## 2025-08-25 ENCOUNTER — OFFICE VISIT (OUTPATIENT)
Dept: CARDIOLOGY | Age: 65
End: 2025-08-25
Payer: MEDICARE

## 2025-08-25 VITALS
WEIGHT: 236 LBS | SYSTOLIC BLOOD PRESSURE: 146 MMHG | BODY MASS INDEX: 33.04 KG/M2 | DIASTOLIC BLOOD PRESSURE: 70 MMHG | HEART RATE: 66 BPM | HEIGHT: 71 IN | OXYGEN SATURATION: 97 %

## 2025-08-25 DIAGNOSIS — R94.31 ABNORMAL ECG: ICD-10-CM

## 2025-08-25 DIAGNOSIS — I10 ESSENTIAL HYPERTENSION: Primary | ICD-10-CM

## 2025-08-25 DIAGNOSIS — I49.5 SICK SINUS SYNDROME (HCC): ICD-10-CM

## 2025-08-25 DIAGNOSIS — I10 HYPERTENSION, ESSENTIAL: ICD-10-CM

## 2025-08-25 DIAGNOSIS — Z95.0 PACEMAKER: Primary | ICD-10-CM

## 2025-08-25 DIAGNOSIS — Z95.0 PACEMAKER: ICD-10-CM

## 2025-08-25 DIAGNOSIS — I45.10 RBBB: ICD-10-CM

## 2025-08-25 DIAGNOSIS — Z95.0 S/P PLACEMENT OF CARDIAC PACEMAKER: ICD-10-CM

## 2025-08-25 DIAGNOSIS — I47.10 PAROXYSMAL SVT (SUPRAVENTRICULAR TACHYCARDIA): ICD-10-CM

## 2025-08-25 PROCEDURE — 93010 ELECTROCARDIOGRAM REPORT: CPT | Performed by: INTERNAL MEDICINE

## 2025-08-25 PROCEDURE — 3077F SYST BP >= 140 MM HG: CPT | Performed by: INTERNAL MEDICINE

## 2025-08-25 PROCEDURE — 3017F COLORECTAL CA SCREEN DOC REV: CPT | Performed by: INTERNAL MEDICINE

## 2025-08-25 PROCEDURE — 1123F ACP DISCUSS/DSCN MKR DOCD: CPT | Performed by: INTERNAL MEDICINE

## 2025-08-25 PROCEDURE — 99212 OFFICE O/P EST SF 10 MIN: CPT | Performed by: INTERNAL MEDICINE

## 2025-08-25 PROCEDURE — 93005 ELECTROCARDIOGRAM TRACING: CPT | Performed by: INTERNAL MEDICINE

## 2025-08-25 PROCEDURE — G8427 DOCREV CUR MEDS BY ELIG CLIN: HCPCS | Performed by: INTERNAL MEDICINE

## 2025-08-25 PROCEDURE — G8417 CALC BMI ABV UP PARAM F/U: HCPCS | Performed by: INTERNAL MEDICINE

## 2025-08-25 PROCEDURE — 99214 OFFICE O/P EST MOD 30 MIN: CPT | Performed by: INTERNAL MEDICINE

## 2025-08-25 PROCEDURE — 1036F TOBACCO NON-USER: CPT | Performed by: INTERNAL MEDICINE

## 2025-08-25 PROCEDURE — 3078F DIAST BP <80 MM HG: CPT | Performed by: INTERNAL MEDICINE

## 2025-08-25 PROCEDURE — 93287 PERI-PX DEVICE EVAL & PRGR: CPT | Performed by: INTERNAL MEDICINE

## 2025-08-25 RX ORDER — LISINOPRIL 30 MG/1
30 TABLET ORAL DAILY
Qty: 90 TABLET | Refills: 3 | Status: SHIPPED | OUTPATIENT
Start: 2025-08-25

## 2025-08-25 RX ORDER — ACETAMINOPHEN 500 MG
1000 TABLET ORAL 2 TIMES DAILY
COMMUNITY

## (undated) DEVICE — CONNECTOR TBNG AUX H2O JET DISP FOR OLY 160/180 SER

## (undated) DEVICE — 1200CC GUARDIAN II: Brand: GUARDIAN

## (undated) DEVICE — MERCY DEFIANCE ENDO KIT: Brand: MEDLINE INDUSTRIES, INC.

## (undated) DEVICE — LINE SAMP O2 6.5FT W/FEMALE CONN F/ADULT CAPNOLINE PLUS

## (undated) DEVICE — 60 ML SYRINGE REGULAR TIP: Brand: MONOJECT

## (undated) DEVICE — COLONOSCOPE ENDOSCP ABSORBENT SM PEDIATRIC 104 MM VISION